# Patient Record
Sex: FEMALE | Race: OTHER | HISPANIC OR LATINO | URBAN - METROPOLITAN AREA
[De-identification: names, ages, dates, MRNs, and addresses within clinical notes are randomized per-mention and may not be internally consistent; named-entity substitution may affect disease eponyms.]

---

## 2022-01-26 ENCOUNTER — INPATIENT (INPATIENT)
Facility: HOSPITAL | Age: 82
LOS: 4 days | Discharge: NURSING, REHAB OR LONG-TERM CARE | DRG: 258 | End: 2022-01-31
Attending: INTERNAL MEDICINE | Admitting: INTERNAL MEDICINE
Payer: MEDICARE

## 2022-01-26 VITALS
WEIGHT: 139.99 LBS | TEMPERATURE: 98 F | DIASTOLIC BLOOD PRESSURE: 75 MMHG | HEART RATE: 57 BPM | HEIGHT: 63 IN | OXYGEN SATURATION: 97 % | RESPIRATION RATE: 18 BRPM | SYSTOLIC BLOOD PRESSURE: 137 MMHG

## 2022-01-26 LAB
ALBUMIN SERPL ELPH-MCNC: 2.5 G/DL — LOW (ref 3.4–5)
ALP SERPL-CCNC: 172 U/L — HIGH (ref 40–120)
ALT FLD-CCNC: 39 U/L — SIGNIFICANT CHANGE UP (ref 12–42)
ANION GAP SERPL CALC-SCNC: 6 MMOL/L — LOW (ref 9–16)
AST SERPL-CCNC: 63 U/L — HIGH (ref 15–37)
BASOPHILS # BLD AUTO: 0.04 K/UL — SIGNIFICANT CHANGE UP (ref 0–0.2)
BASOPHILS NFR BLD AUTO: 0.7 % — SIGNIFICANT CHANGE UP (ref 0–2)
BILIRUB SERPL-MCNC: 1.6 MG/DL — HIGH (ref 0.2–1.2)
BUN SERPL-MCNC: 13 MG/DL — SIGNIFICANT CHANGE UP (ref 7–23)
CALCIUM SERPL-MCNC: 8.4 MG/DL — LOW (ref 8.5–10.5)
CHLORIDE SERPL-SCNC: 108 MMOL/L — SIGNIFICANT CHANGE UP (ref 96–108)
CO2 SERPL-SCNC: 26 MMOL/L — SIGNIFICANT CHANGE UP (ref 22–31)
CREAT SERPL-MCNC: 0.8 MG/DL — SIGNIFICANT CHANGE UP (ref 0.5–1.3)
EOSINOPHIL # BLD AUTO: 0.14 K/UL — SIGNIFICANT CHANGE UP (ref 0–0.5)
EOSINOPHIL NFR BLD AUTO: 2.4 % — SIGNIFICANT CHANGE UP (ref 0–6)
GLUCOSE SERPL-MCNC: 108 MG/DL — HIGH (ref 70–99)
HCT VFR BLD CALC: 42.3 % — SIGNIFICANT CHANGE UP (ref 34.5–45)
HGB BLD-MCNC: 13.3 G/DL — SIGNIFICANT CHANGE UP (ref 11.5–15.5)
IMM GRANULOCYTES NFR BLD AUTO: 0.3 % — SIGNIFICANT CHANGE UP (ref 0–1.5)
LYMPHOCYTES # BLD AUTO: 0.71 K/UL — LOW (ref 1–3.3)
LYMPHOCYTES # BLD AUTO: 12 % — LOW (ref 13–44)
MAGNESIUM SERPL-MCNC: 1.8 MG/DL — SIGNIFICANT CHANGE UP (ref 1.6–2.6)
MCHC RBC-ENTMCNC: 31.2 PG — SIGNIFICANT CHANGE UP (ref 27–34)
MCHC RBC-ENTMCNC: 31.4 GM/DL — LOW (ref 32–36)
MCV RBC AUTO: 99.3 FL — SIGNIFICANT CHANGE UP (ref 80–100)
MONOCYTES # BLD AUTO: 0.78 K/UL — SIGNIFICANT CHANGE UP (ref 0–0.9)
MONOCYTES NFR BLD AUTO: 13.2 % — SIGNIFICANT CHANGE UP (ref 2–14)
NEUTROPHILS # BLD AUTO: 4.23 K/UL — SIGNIFICANT CHANGE UP (ref 1.8–7.4)
NEUTROPHILS NFR BLD AUTO: 71.4 % — SIGNIFICANT CHANGE UP (ref 43–77)
NRBC # BLD: 0 /100 WBCS — SIGNIFICANT CHANGE UP (ref 0–0)
NT-PROBNP SERPL-SCNC: 3525 PG/ML — HIGH
PLATELET # BLD AUTO: 147 K/UL — LOW (ref 150–400)
POTASSIUM SERPL-MCNC: 4.6 MMOL/L — SIGNIFICANT CHANGE UP (ref 3.5–5.3)
POTASSIUM SERPL-SCNC: 4.6 MMOL/L — SIGNIFICANT CHANGE UP (ref 3.5–5.3)
PROT SERPL-MCNC: 6.2 G/DL — LOW (ref 6.4–8.2)
RBC # BLD: 4.26 M/UL — SIGNIFICANT CHANGE UP (ref 3.8–5.2)
RBC # FLD: 14.9 % — HIGH (ref 10.3–14.5)
SARS-COV-2 RNA SPEC QL NAA+PROBE: SIGNIFICANT CHANGE UP
SODIUM SERPL-SCNC: 140 MMOL/L — SIGNIFICANT CHANGE UP (ref 132–145)
TROPONIN I, HIGH SENSITIVITY RESULT: 38.4 NG/L — SIGNIFICANT CHANGE UP
TSH SERPL-MCNC: 1.09 UIU/ML — SIGNIFICANT CHANGE UP (ref 0.36–3.74)
WBC # BLD: 5.92 K/UL — SIGNIFICANT CHANGE UP (ref 3.8–10.5)
WBC # FLD AUTO: 5.92 K/UL — SIGNIFICANT CHANGE UP (ref 3.8–10.5)

## 2022-01-26 PROCEDURE — 99285 EMERGENCY DEPT VISIT HI MDM: CPT

## 2022-01-26 PROCEDURE — 93970 EXTREMITY STUDY: CPT | Mod: 26

## 2022-01-26 PROCEDURE — 71045 X-RAY EXAM CHEST 1 VIEW: CPT | Mod: 26

## 2022-01-26 RX ORDER — FUROSEMIDE 40 MG
20 TABLET ORAL ONCE
Refills: 0 | Status: DISCONTINUED | OUTPATIENT
Start: 2022-01-26 | End: 2022-01-26

## 2022-01-26 RX ORDER — ACETAMINOPHEN 500 MG
975 TABLET ORAL ONCE
Refills: 0 | Status: COMPLETED | OUTPATIENT
Start: 2022-01-26 | End: 2022-01-26

## 2022-01-26 RX ADMIN — Medication 975 MILLIGRAM(S): at 22:15

## 2022-01-26 NOTE — ED PROVIDER NOTE - CLINICAL SUMMARY MEDICAL DECISION MAKING FREE TEXT BOX
bl LE edema, equal, +pitting, no evidence of ischemic limb on exam, no orthopnea/sob, lungs clear, no hx of chf, will check labs, US r/o DVT given reported hx of "blood clots", bl LE edema, equal, +pitting, no evidence of ischemic limb on exam, no orthopnea/sob, lungs clear, no hx of chf, will check labs, US r/o DVT given reported hx of "blood clots",    elevated pro-bnp, pt does not have any evidence of adhf, xray w/o pulm congestion, pro-bnp could be 2/2 PAH as pt reports hx of such, given pt requires assistance for ambulation/ADL, will need admission

## 2022-01-26 NOTE — ED ADULT NURSE NOTE - NSIMPLEMENTINTERV_GEN_ALL_ED
Implemented All Fall Risk Interventions:  [place] to call system. Call bell, personal items and telephone within reach. Instruct patient to call for assistance. Room bathroom lighting operational. Non-slip footwear when patient is off stretcher. Physically safe environment: no spills, clutter or unnecessary equipment. Stretcher in lowest position, wheels locked, appropriate side rails in place. Provide visual cue, wrist band, yellow gown, etc. Monitor gait and stability. Monitor for mental status changes and reorient to person, place, and time. Review medications for side effects contributing to fall risk. Reinforce activity limits and safety measures with patient and family.

## 2022-01-26 NOTE — ED ADULT NURSE NOTE - OBJECTIVE STATEMENT
Patient states has been having swelling of lower extremities that has progressed over the past two weeks. Denies difficulty breathing or chest pain. States has pain in legs and presents with +2 lower edema

## 2022-01-26 NOTE — ED PROVIDER NOTE - OBJECTIVE STATEMENT
81 yof pw [facility] swelling, x 1-2 weeks.  lives alone.  does not have aide.  able to walk in her apartment but does not leave the apartment on her own.  per son, he brought pt in bc he noted the swelling today.  pt c/o pain to bl LE.  no sob/cp/orthopnea.  hx of pulm hypertension, hx of ppm (for low heart rate), no prior cardiac stents, pt reports hx of clots in her lungs.  no fc/cough/abd pain/nv.    offered  service, pt prefers the son to interpret

## 2022-01-26 NOTE — ED ADULT TRIAGE NOTE - CHIEF COMPLAINT QUOTE
Pt BIBEMS for bilateral pedal lower extremity edema x 2 weeks. Pt denies chest pain and sob. Pt with pace maker currently on blood thinners.

## 2022-01-26 NOTE — ED PROVIDER NOTE - NS ED ROS FT
· CONSTITUTIONAL: no fever and no chills.  · EYES: no visual changes  · ENMT: no change/loss in taste  · CARDIOVASCULAR: swelling of LE, no cp  · RESPIRATORY: no cough/sob   · GASTROINTESTINAL: no pain, no nvd  · MUSCULOSKELETAL: no pain  · SKIN: no bleeding  · ENDOCRINE: no DM  · NEURO: no headache

## 2022-01-26 NOTE — ED PROVIDER NOTE - PHYSICAL EXAMINATION
Physical Exam  GEN: Awake, alert, non-toxic appearing,  EYES: full EOMI,  ENT: External inspection normal, normal voice,   HEAD: atraumatic  NECK: FROM neck, supple,   CV: rrr, 2+ bl pitting edema, +pitting edema overlying the ilium,  RESP: cta bl, no rales/crackles, no tachypnea, no hypoxia, no resp distress,  GI: soft, nondistended, nontender  MSK: brown x 4  SKIN: pedal pulse intact, cap refill < 2 sec, mildly brawny erythema over distal LE bl  NEURO: ao x 3, speaking clearly and coherently, strength equal bl,

## 2022-01-26 NOTE — ED PROVIDER NOTE - CARE PLAN
1 Principal Discharge DX:	Bilateral lower extremity edema  Secondary Diagnosis:	Generalized weakness  Secondary Diagnosis:	Pulmonary hypertension

## 2022-01-26 NOTE — ED PROVIDER NOTE - PROGRESS NOTE DETAILS
med list: eliquis, statin, metoprolo, fluoxetine med list: eliquis, statin, metoprolol, fluoxetine pt requires assistance to sit up in bed, and requires max assist to swing her legs off of the stretcher, upon trying to stand up, pt collapsed onto son's arms, no LOC/near syncope case reviewed w/ both medicine and cardiology team, will admit to cardiology tele for further workup

## 2022-01-27 DIAGNOSIS — R00.1 BRADYCARDIA, UNSPECIFIED: ICD-10-CM

## 2022-01-27 DIAGNOSIS — Z95.0 PRESENCE OF CARDIAC PACEMAKER: Chronic | ICD-10-CM

## 2022-01-27 DIAGNOSIS — Z98.49 CATARACT EXTRACTION STATUS, UNSPECIFIED EYE: Chronic | ICD-10-CM

## 2022-01-27 DIAGNOSIS — R60.0 LOCALIZED EDEMA: ICD-10-CM

## 2022-01-27 DIAGNOSIS — E78.00 PURE HYPERCHOLESTEROLEMIA, UNSPECIFIED: ICD-10-CM

## 2022-01-27 DIAGNOSIS — I10 ESSENTIAL (PRIMARY) HYPERTENSION: ICD-10-CM

## 2022-01-27 DIAGNOSIS — I27.20 PULMONARY HYPERTENSION, UNSPECIFIED: ICD-10-CM

## 2022-01-27 LAB
ANION GAP SERPL CALC-SCNC: 8 MMOL/L — SIGNIFICANT CHANGE UP (ref 5–17)
APTT BLD: 34.7 SEC — SIGNIFICANT CHANGE UP (ref 27.5–35.5)
BUN SERPL-MCNC: 11 MG/DL — SIGNIFICANT CHANGE UP (ref 7–23)
CALCIUM SERPL-MCNC: 8.5 MG/DL — SIGNIFICANT CHANGE UP (ref 8.4–10.5)
CHLORIDE SERPL-SCNC: 108 MMOL/L — SIGNIFICANT CHANGE UP (ref 96–108)
CO2 SERPL-SCNC: 23 MMOL/L — SIGNIFICANT CHANGE UP (ref 22–31)
CREAT SERPL-MCNC: 0.82 MG/DL — SIGNIFICANT CHANGE UP (ref 0.5–1.3)
GLUCOSE SERPL-MCNC: 113 MG/DL — HIGH (ref 70–99)
HCT VFR BLD CALC: 38.1 % — SIGNIFICANT CHANGE UP (ref 34.5–45)
HGB BLD-MCNC: 12 G/DL — SIGNIFICANT CHANGE UP (ref 11.5–15.5)
INR BLD: 1.3 — HIGH (ref 0.88–1.16)
MAGNESIUM SERPL-MCNC: 1.7 MG/DL — SIGNIFICANT CHANGE UP (ref 1.6–2.6)
MCHC RBC-ENTMCNC: 30.6 PG — SIGNIFICANT CHANGE UP (ref 27–34)
MCHC RBC-ENTMCNC: 31.5 GM/DL — LOW (ref 32–36)
MCV RBC AUTO: 97.2 FL — SIGNIFICANT CHANGE UP (ref 80–100)
NRBC # BLD: 0 /100 WBCS — SIGNIFICANT CHANGE UP (ref 0–0)
PLATELET # BLD AUTO: 152 K/UL — SIGNIFICANT CHANGE UP (ref 150–400)
POTASSIUM SERPL-MCNC: 3.7 MMOL/L — SIGNIFICANT CHANGE UP (ref 3.5–5.3)
POTASSIUM SERPL-SCNC: 3.7 MMOL/L — SIGNIFICANT CHANGE UP (ref 3.5–5.3)
PROTHROM AB SERPL-ACNC: 15.4 SEC — HIGH (ref 10.6–13.6)
RBC # BLD: 3.92 M/UL — SIGNIFICANT CHANGE UP (ref 3.8–5.2)
RBC # FLD: 14.8 % — HIGH (ref 10.3–14.5)
SODIUM SERPL-SCNC: 139 MMOL/L — SIGNIFICANT CHANGE UP (ref 135–145)
TROPONIN T SERPL-MCNC: 0.01 NG/ML — SIGNIFICANT CHANGE UP (ref 0–0.01)
WBC # BLD: 4.62 K/UL — SIGNIFICANT CHANGE UP (ref 3.8–10.5)
WBC # FLD AUTO: 4.62 K/UL — SIGNIFICANT CHANGE UP (ref 3.8–10.5)

## 2022-01-27 PROCEDURE — 99222 1ST HOSP IP/OBS MODERATE 55: CPT

## 2022-01-27 PROCEDURE — 33228 REMV&REPLC PM GEN DUAL LEAD: CPT

## 2022-01-27 PROCEDURE — 93010 ELECTROCARDIOGRAM REPORT: CPT

## 2022-01-27 PROCEDURE — 93306 TTE W/DOPPLER COMPLETE: CPT | Mod: 26

## 2022-01-27 RX ORDER — FUROSEMIDE 40 MG
40 TABLET ORAL DAILY
Refills: 0 | Status: DISCONTINUED | OUTPATIENT
Start: 2022-01-27 | End: 2022-01-27

## 2022-01-27 RX ORDER — CEFAZOLIN SODIUM 1 G
VIAL (EA) INJECTION
Refills: 0 | Status: COMPLETED | OUTPATIENT
Start: 2022-01-27 | End: 2022-01-28

## 2022-01-27 RX ORDER — METOPROLOL TARTRATE 50 MG
25 TABLET ORAL DAILY
Refills: 0 | Status: DISCONTINUED | OUTPATIENT
Start: 2022-01-27 | End: 2022-01-27

## 2022-01-27 RX ORDER — MAGNESIUM SULFATE 500 MG/ML
2 VIAL (ML) INJECTION ONCE
Refills: 0 | Status: COMPLETED | OUTPATIENT
Start: 2022-01-27 | End: 2022-01-27

## 2022-01-27 RX ORDER — APIXABAN 2.5 MG/1
2.5 TABLET, FILM COATED ORAL
Refills: 0 | Status: DISCONTINUED | OUTPATIENT
Start: 2022-01-27 | End: 2022-01-27

## 2022-01-27 RX ORDER — FUROSEMIDE 40 MG
40 TABLET ORAL DAILY
Refills: 0 | Status: DISCONTINUED | OUTPATIENT
Start: 2022-01-27 | End: 2022-01-28

## 2022-01-27 RX ORDER — FLUOXETINE HCL 10 MG
1 CAPSULE ORAL
Qty: 0 | Refills: 0 | DISCHARGE

## 2022-01-27 RX ORDER — CHOLECALCIFEROL (VITAMIN D3) 125 MCG
1 CAPSULE ORAL
Qty: 0 | Refills: 0 | DISCHARGE

## 2022-01-27 RX ORDER — POTASSIUM CHLORIDE 20 MEQ
20 PACKET (EA) ORAL ONCE
Refills: 0 | Status: COMPLETED | OUTPATIENT
Start: 2022-01-27 | End: 2022-01-27

## 2022-01-27 RX ORDER — FLUOXETINE HCL 10 MG
40 CAPSULE ORAL DAILY
Refills: 0 | Status: DISCONTINUED | OUTPATIENT
Start: 2022-01-27 | End: 2022-01-31

## 2022-01-27 RX ORDER — ATORVASTATIN CALCIUM 80 MG/1
1 TABLET, FILM COATED ORAL
Qty: 0 | Refills: 0 | DISCHARGE

## 2022-01-27 RX ORDER — ACETAMINOPHEN 500 MG
650 TABLET ORAL ONCE
Refills: 0 | Status: COMPLETED | OUTPATIENT
Start: 2022-01-27 | End: 2022-01-27

## 2022-01-27 RX ORDER — METOPROLOL TARTRATE 50 MG
1 TABLET ORAL
Qty: 0 | Refills: 0 | DISCHARGE

## 2022-01-27 RX ORDER — ATORVASTATIN CALCIUM 80 MG/1
10 TABLET, FILM COATED ORAL AT BEDTIME
Refills: 0 | Status: DISCONTINUED | OUTPATIENT
Start: 2022-01-27 | End: 2022-01-31

## 2022-01-27 RX ORDER — CEFAZOLIN SODIUM 1 G
1000 VIAL (EA) INJECTION ONCE
Refills: 0 | Status: COMPLETED | OUTPATIENT
Start: 2022-01-27 | End: 2022-01-27

## 2022-01-27 RX ORDER — ONDANSETRON 8 MG/1
4 TABLET, FILM COATED ORAL EVERY 8 HOURS
Refills: 0 | Status: DISCONTINUED | OUTPATIENT
Start: 2022-01-27 | End: 2022-01-31

## 2022-01-27 RX ORDER — APIXABAN 2.5 MG/1
1 TABLET, FILM COATED ORAL
Qty: 0 | Refills: 0 | DISCHARGE

## 2022-01-27 RX ORDER — LANOLIN ALCOHOL/MO/W.PET/CERES
3 CREAM (GRAM) TOPICAL AT BEDTIME
Refills: 0 | Status: DISCONTINUED | OUTPATIENT
Start: 2022-01-27 | End: 2022-01-31

## 2022-01-27 RX ORDER — ACETAMINOPHEN 500 MG
650 TABLET ORAL EVERY 6 HOURS
Refills: 0 | Status: DISCONTINUED | OUTPATIENT
Start: 2022-01-27 | End: 2022-01-31

## 2022-01-27 RX ORDER — CHOLECALCIFEROL (VITAMIN D3) 125 MCG
1000 CAPSULE ORAL DAILY
Refills: 0 | Status: DISCONTINUED | OUTPATIENT
Start: 2022-01-27 | End: 2022-01-31

## 2022-01-27 RX ORDER — CEFAZOLIN SODIUM 1 G
1000 VIAL (EA) INJECTION EVERY 8 HOURS
Refills: 0 | Status: COMPLETED | OUTPATIENT
Start: 2022-01-27 | End: 2022-01-28

## 2022-01-27 RX ORDER — INFLUENZA VIRUS VACCINE 15; 15; 15; 15 UG/.5ML; UG/.5ML; UG/.5ML; UG/.5ML
0.7 SUSPENSION INTRAMUSCULAR ONCE
Refills: 0 | Status: DISCONTINUED | OUTPATIENT
Start: 2022-01-27 | End: 2022-01-31

## 2022-01-27 RX ADMIN — Medication 40 MILLIGRAM(S): at 15:21

## 2022-01-27 RX ADMIN — Medication 100 MILLIGRAM(S): at 13:16

## 2022-01-27 RX ADMIN — Medication 20 MILLIEQUIVALENT(S): at 11:31

## 2022-01-27 RX ADMIN — Medication 650 MILLIGRAM(S): at 12:32

## 2022-01-27 RX ADMIN — Medication 40 MILLIGRAM(S): at 11:31

## 2022-01-27 RX ADMIN — Medication 650 MILLIGRAM(S): at 18:00

## 2022-01-27 RX ADMIN — APIXABAN 2.5 MILLIGRAM(S): 2.5 TABLET, FILM COATED ORAL at 05:57

## 2022-01-27 RX ADMIN — Medication 650 MILLIGRAM(S): at 11:32

## 2022-01-27 RX ADMIN — Medication 650 MILLIGRAM(S): at 05:15

## 2022-01-27 RX ADMIN — ATORVASTATIN CALCIUM 10 MILLIGRAM(S): 80 TABLET, FILM COATED ORAL at 22:07

## 2022-01-27 RX ADMIN — Medication 100 MILLIGRAM(S): at 22:07

## 2022-01-27 RX ADMIN — Medication 650 MILLIGRAM(S): at 04:41

## 2022-01-27 RX ADMIN — Medication 1000 UNIT(S): at 11:32

## 2022-01-27 RX ADMIN — Medication 650 MILLIGRAM(S): at 19:00

## 2022-01-27 RX ADMIN — Medication 25 GRAM(S): at 11:33

## 2022-01-27 RX ADMIN — Medication 25 MILLIGRAM(S): at 05:57

## 2022-01-27 NOTE — H&P ADULT - HISTORY OF PRESENT ILLNESS
This is a 82 y/o female with HTN, Pulmonary HTN, Hypercholesterolemia, bradycardia s/p PPM, presented to [facility] with 2 weeks of lower extremities swelling up to the thighs,  associating with pain, resulting in difficulty to ambulate. Pt denies SOB, orthopnea, chest pain  In the ER, initial vital stable as follow: /75, HR 57, R 18, T 36.7, O2 97% RA; labs with troponin I 38.4, AST 63, Alk phos 172, BNP 3525. US legs: No evidence of deep venous  thrombosis above the knee bilaterally. Nonvisualization of the bilateral peroneal and posterior tibial veins secondary to marked subcutaneous edema. CXR without pneumonia or CHF.  + cardiomegaly.  She received Lasix 20mg IV, Tylenol 975mg and transferred to [facility] for further management

## 2022-01-27 NOTE — H&P ADULT - ATTENDING COMMENTS
Initial attending contact date 1/27 on morning bedside rounds. See attending addendum to HPI here for initial attending contact documentation.  81F with Essential HTN, reported hx Pulmonary HTN, HLD, Bradycardia s/p PPM, reported hx PE formerly on Coumadin, hx pAfib now on Eliquis 2.5 BID, who presented to [facility] with report of difficulty walking due to leg pain and edema.  No CP, SOB, palpitations, LH or dizziness.  EP consulted upon arrival and PPM generator found to be at EOL, patient V-paced 50bpm.  This chronic V-pacing at 50bpm as safety back up in context of EOL status likely leading to low CO and subsequent clinical s/sx edema.    Plan for:  NPO for generator change with EP today  Lasix 40mg po daily gentle diuresis  ACE wrap LEs  Hold home Eliquis (dose reduced for age and weight <60kg)  Hold home beta blocker pending generator change  Obtain TTE for structural assessment  EP consult and expedited generator change appreciated  PT consulted, initial recommendation for BRITTANIE  Dietician/Nutrition consulted  Arelis Galeas M.D.  Cardiology Attending  80minutes spent on total encounter; more than 50% of the visit was spent counseling and/or coordinating care by the attending physician, with plan of care discussed with the patient, EP team at bedside and cardiac team.

## 2022-01-27 NOTE — H&P ADULT - NSHPREVIEWOFSYSTEMS_GEN_ALL_CORE
GENERAL, CONSTITUTIONAL : denies recent weight loss, fever, chills  EYES, VISION: denies changes in vision   EARS, NOSE, THROAT: denies hearing loss  HEART, CARDIOVASCULAR: + LE edema. Denies chest pain, arrhythmia, palpitations, SOB, claudication  RESPIRATORY: Denies cough, SOB, wheezing, PND, orthopnea  GASTROINTESTINAL: Denies abdominal pain, heartburn, bloody stool, dark tarry stool  GENITOURINARY: Denies frequent urination, urgency  MUSCULOSKELETAL denies joint pain or swelling, restricted motion, musculoskeletal pain.   SKIN & INTEGUMENTARY Denies rashes, sores, blisters, blisters, growths.  NEUROLOGICAL: Denies numbness or tingling sensations, sensation loss, burning.   PSYCHIATRIC: Denies nervousness, anxiety, depression  ENDOCRINE Denies heat or cold intolerance, excessive thirst  HEMATOLOGIC/LYMPHATIC: Denies abnormal bleeding, bleeding of any kind

## 2022-01-27 NOTE — PHYSICAL THERAPY INITIAL EVALUATION ADULT - ACTIVE RANGE OF MOTION EXAMINATION, REHAB EVAL
except decreased bilateral ankle DF/PF and patient with pain with right shoulder flexion/no Active ROM deficits were identified

## 2022-01-27 NOTE — PHYSICAL THERAPY INITIAL EVALUATION ADULT - PERTINENT HX OF CURRENT PROBLEM, REHAB EVAL
This is a 80 y/o female with HTN, Pulmonary HTN, Hypercholesterolemia, bradycardia s/p PPM, presented to [facility] with 2 weeks of lower extremities swelling up to the thighs,

## 2022-01-27 NOTE — H&P ADULT - NSHPPHYSICALEXAM_GEN_ALL_CORE
T(C): 36.7 (01-27-22 @ 05:10), Max: 37.1 (01-26-22 @ 20:51)  HR: 52 (01-27-22 @ 03:45) (52 - 57)  BP: 145/70 (01-27-22 @ 03:45) (137/75 - 145/70)  RR: 20 (01-27-22 @ 03:45) (18 - 20)  SpO2: 99% (01-27-22 @ 03:45) (97% - 99%)  Wt(kg): --    Appearance: Normal	  HEENT: Normal oral mucosa, PERRL, EOMI	  Neck: Supple, - JVD; No Carotid Bruit and 2+ pulses B/L  Cardiovascular: Normal S1 S2, No JVD, No murmurs  Respiratory: Lungs clear to auscultation, no Rales, Rhonchi, Wheezing	  Gastrointestinal:  Soft, Non-tender, + BS	  Skin: No rashes, No ecchymoses, No cyanosis  Extremities: 3+ pitting edema up to the thighs b/l  Vascular: Femoral pulses 2+ b/l without bruit, DP 1+ b/l, PT 1+ b/l  Neurologic: Non-focal  Psychiatry: A & O x 3, Mood & affect appropriate

## 2022-01-27 NOTE — PROGRESS NOTE ADULT - SUBJECTIVE AND OBJECTIVE BOX
EPS Device interrogation    Indication: SOB    Device model: 	[employer] [place] 			    Functioning Mode: 	VVI %) 		    Underlying Rhythm:     Pacemaker dependency:  No    Battery status: EOL 11/23/21   Interrogating parameters:   				RA			RV			LV    Sense:                                                                    Threshold:                                                                                                                       Pacing Impedance:                                                                                                               Shock Impedance:                                                                                                                   Events/Alert:  none    Parameter change: 	    PAtient with [employer] PPM , battery is at EOL since 11/23/21,  pacemaker is in VVI 50 mode unable to do full interrogation at this time, patient needs generator change. Will plan fo procedure today.    [ ]EPS attending: Interrogation reviewed. Agree with above.

## 2022-01-27 NOTE — PATIENT PROFILE ADULT - FALL HARM RISK - HARM RISK INTERVENTIONS

## 2022-01-27 NOTE — H&P ADULT - PROBLEM SELECTOR PLAN 1
Up to the thighs, no sign of cellulitis  US LE without DVT +subcutaneous edema  Received Lasix 20mg x 1

## 2022-01-27 NOTE — H&P ADULT - NSICDXPASTMEDICALHX_GEN_ALL_CORE_FT
PAST MEDICAL HISTORY:  Anxiety and depression     Bradycardia     History of pacemaker     HTN (hypertension)     Hypercholesterolemia     Pulmonary hypertension

## 2022-01-27 NOTE — GOALS OF CARE CONVERSATION - ADVANCED CARE PLANNING - CONVERSATION DETAILS
Pt's PPM has been at [facility] since September and have previous rescheduled PPM generator change until 2/9/22 at [facility]. Now PPM found to be EOL upon PPM interrogation today. Discussed GOC with patient and pt wishes to have PPM generator change at [facility] and wishes to be FULL CODE.

## 2022-01-27 NOTE — H&P ADULT - NSHPLABSRESULTS_GEN_ALL_CORE
13.3   5.92  )-----------( 147      ( 26 Jan 2022 21:06 )             42.3       01-26    140  |  108  |  13  ----------------------------<  108<H>  4.6   |  26  |  0.80    Ca    8.4<L>      26 Jan 2022 21:06  Mg     1.8     01-26    TPro  6.2<L>  /  Alb  2.5<L>  /  TBili  1.6<H>  /  DBili  x   /  AST  63<H>  /  ALT  39  /  AlkPhos  172<H>  01-26        EKG: pending

## 2022-01-28 DIAGNOSIS — I50.810 RIGHT HEART FAILURE, UNSPECIFIED: ICD-10-CM

## 2022-01-28 DIAGNOSIS — Z45.010 ENCOUNTER FOR CHECKING AND TESTING OF CARDIAC PACEMAKER PULSE GENERATOR [BATTERY]: ICD-10-CM

## 2022-01-28 DIAGNOSIS — I48.0 PAROXYSMAL ATRIAL FIBRILLATION: ICD-10-CM

## 2022-01-28 DIAGNOSIS — I50.31 ACUTE DIASTOLIC (CONGESTIVE) HEART FAILURE: ICD-10-CM

## 2022-01-28 LAB
A1C WITH ESTIMATED AVERAGE GLUCOSE RESULT: 7.3 % — HIGH (ref 4–5.6)
ALBUMIN SERPL ELPH-MCNC: 3.1 G/DL — LOW (ref 3.3–5)
ALP SERPL-CCNC: 173 U/L — HIGH (ref 40–120)
ALT FLD-CCNC: 24 U/L — SIGNIFICANT CHANGE UP (ref 10–45)
ANION GAP SERPL CALC-SCNC: 10 MMOL/L — SIGNIFICANT CHANGE UP (ref 5–17)
AST SERPL-CCNC: 38 U/L — SIGNIFICANT CHANGE UP (ref 10–40)
BILIRUB SERPL-MCNC: 1.2 MG/DL — SIGNIFICANT CHANGE UP (ref 0.2–1.2)
BUN SERPL-MCNC: 14 MG/DL — SIGNIFICANT CHANGE UP (ref 7–23)
CALCIUM SERPL-MCNC: 9.1 MG/DL — SIGNIFICANT CHANGE UP (ref 8.4–10.5)
CHLORIDE SERPL-SCNC: 106 MMOL/L — SIGNIFICANT CHANGE UP (ref 96–108)
CHOLEST SERPL-MCNC: 100 MG/DL — SIGNIFICANT CHANGE UP
CO2 SERPL-SCNC: 26 MMOL/L — SIGNIFICANT CHANGE UP (ref 22–31)
CREAT SERPL-MCNC: 1.02 MG/DL — SIGNIFICANT CHANGE UP (ref 0.5–1.3)
ESTIMATED AVERAGE GLUCOSE: 163 MG/DL — HIGH (ref 68–114)
GLUCOSE BLDC GLUCOMTR-MCNC: 131 MG/DL — HIGH (ref 70–99)
GLUCOSE SERPL-MCNC: 93 MG/DL — SIGNIFICANT CHANGE UP (ref 70–99)
HCT VFR BLD CALC: 44.1 % — SIGNIFICANT CHANGE UP (ref 34.5–45)
HDLC SERPL-MCNC: 41 MG/DL — LOW
HGB BLD-MCNC: 14.2 G/DL — SIGNIFICANT CHANGE UP (ref 11.5–15.5)
LIPID PNL WITH DIRECT LDL SERPL: 46 MG/DL — SIGNIFICANT CHANGE UP
MAGNESIUM SERPL-MCNC: 1.8 MG/DL — SIGNIFICANT CHANGE UP (ref 1.6–2.6)
MCHC RBC-ENTMCNC: 31.1 PG — SIGNIFICANT CHANGE UP (ref 27–34)
MCHC RBC-ENTMCNC: 32.2 GM/DL — SIGNIFICANT CHANGE UP (ref 32–36)
MCV RBC AUTO: 96.7 FL — SIGNIFICANT CHANGE UP (ref 80–100)
NON HDL CHOLESTEROL: 59 MG/DL — SIGNIFICANT CHANGE UP
NRBC # BLD: 0 /100 WBCS — SIGNIFICANT CHANGE UP (ref 0–0)
PLATELET # BLD AUTO: 197 K/UL — SIGNIFICANT CHANGE UP (ref 150–400)
POTASSIUM SERPL-MCNC: 3.6 MMOL/L — SIGNIFICANT CHANGE UP (ref 3.5–5.3)
POTASSIUM SERPL-SCNC: 3.6 MMOL/L — SIGNIFICANT CHANGE UP (ref 3.5–5.3)
PROT SERPL-MCNC: 6.3 G/DL — SIGNIFICANT CHANGE UP (ref 6–8.3)
RBC # BLD: 4.56 M/UL — SIGNIFICANT CHANGE UP (ref 3.8–5.2)
RBC # FLD: 14.7 % — HIGH (ref 10.3–14.5)
SODIUM SERPL-SCNC: 142 MMOL/L — SIGNIFICANT CHANGE UP (ref 135–145)
TRIGL SERPL-MCNC: 64 MG/DL — SIGNIFICANT CHANGE UP
TSH SERPL-MCNC: 1.09 UIU/ML — SIGNIFICANT CHANGE UP (ref 0.27–4.2)
WBC # BLD: 5.88 K/UL — SIGNIFICANT CHANGE UP (ref 3.8–10.5)
WBC # FLD AUTO: 5.88 K/UL — SIGNIFICANT CHANGE UP (ref 3.8–10.5)

## 2022-01-28 PROCEDURE — 99233 SBSQ HOSP IP/OBS HIGH 50: CPT

## 2022-01-28 PROCEDURE — 99231 SBSQ HOSP IP/OBS SF/LOW 25: CPT

## 2022-01-28 RX ORDER — METOPROLOL TARTRATE 50 MG
25 TABLET ORAL DAILY
Refills: 0 | Status: DISCONTINUED | OUTPATIENT
Start: 2022-01-28 | End: 2022-01-31

## 2022-01-28 RX ORDER — MAGNESIUM SULFATE 500 MG/ML
2 VIAL (ML) INJECTION ONCE
Refills: 0 | Status: COMPLETED | OUTPATIENT
Start: 2022-01-28 | End: 2022-01-28

## 2022-01-28 RX ORDER — APIXABAN 2.5 MG/1
2.5 TABLET, FILM COATED ORAL EVERY 12 HOURS
Refills: 0 | Status: DISCONTINUED | OUTPATIENT
Start: 2022-01-28 | End: 2022-01-29

## 2022-01-28 RX ORDER — POTASSIUM CHLORIDE 20 MEQ
40 PACKET (EA) ORAL ONCE
Refills: 0 | Status: COMPLETED | OUTPATIENT
Start: 2022-01-28 | End: 2022-01-28

## 2022-01-28 RX ADMIN — Medication 40 MILLIGRAM(S): at 05:37

## 2022-01-28 RX ADMIN — APIXABAN 2.5 MILLIGRAM(S): 2.5 TABLET, FILM COATED ORAL at 17:28

## 2022-01-28 RX ADMIN — Medication 40 MILLIGRAM(S): at 11:56

## 2022-01-28 RX ADMIN — Medication 100 MILLIGRAM(S): at 05:37

## 2022-01-28 RX ADMIN — Medication 650 MILLIGRAM(S): at 11:59

## 2022-01-28 RX ADMIN — Medication 1000 UNIT(S): at 11:57

## 2022-01-28 RX ADMIN — Medication 25 MILLIGRAM(S): at 14:35

## 2022-01-28 RX ADMIN — Medication 40 MILLIEQUIVALENT(S): at 11:57

## 2022-01-28 RX ADMIN — Medication 25 GRAM(S): at 11:57

## 2022-01-28 NOTE — PROVIDER CONTACT NOTE (FALL NOTIFICATION) - ASSESSMENT
VSS, IV inadvertently removed , blood trickling down hand and on gown , pt speaking Pashto not responding in English initially

## 2022-01-28 NOTE — PROGRESS NOTE ADULT - PROBLEM SELECTOR PLAN 5
Pt with PPM, states that she needs generator change  Will ask EP to interrogate -Continue Lipitor 10mg daily  -F/u Lipid profile    F: No IVF  N: DASH/TLC/DM diet  E: Replete lytes PRN K<4, Mg<2  P: DVT PPX: on Eliquis  C: FULL CODE  Dispo: Medically stable for DC, needs safe dispo. No AM labs needs pending dispo.  PT/OT rec BRITTANIE, but pt and family is hesitant. Daughter Heydi Osborn wants pt to be D/C to her house in [place]

## 2022-01-28 NOTE — OCCUPATIONAL THERAPY INITIAL EVALUATION ADULT - GENERAL OBSERVATIONS, REHAB EVAL
Pt received semi-supine in bed, +tele, +heplock, +BLE ACE wrap, in NAD and agreeable to OT. Cleared by FAIZAN Amanda to see.

## 2022-01-28 NOTE — PROGRESS NOTE ADULT - PROBLEM SELECTOR PLAN 2
On Eliquis, will continue [employer] PPM was at EOL upon EP interrogation w/ VVI Pacing 50s  -S/p [employer] PPM generator change 1/27 w/ Dr Chairez, now A-Pacing 70-80s  -Pt may follow up with [facility] EP or [place] EP per her preference

## 2022-01-28 NOTE — PROGRESS NOTE ADULT - PROBLEM SELECTOR PLAN 1
Up to the thighs, no sign of cellulitis  US LE without DVT +subcutaneous edema  Received Lasix 20mg x 1 C/o diogenes LE swelling x 2 weeks a/w CHATTERJEE. CXR w/ cardiomegaly. BNP 3525. EKG nonischemic.  -Etiology likely iatrogenic cause from PPM EOL status and VVI pacing at 50bpm w/ AV dissociation and decrease CO  -S/p IV Lasix 40mg x 1 day  -No further need for diuresis, now euvolemic  -ECHO 1/27: EF 55-60%, RV overload, severely dilated RV, biatrial enlargement, mild-moderate MR, severe TR, PASP 19.  -Diogenes LE US 1/26: negative for DVT above the knee bilaterally. Nonvisualization of the bilateral peroneal and posterior tibial veins secondary to marked subcutaneous edema.

## 2022-01-28 NOTE — OCCUPATIONAL THERAPY INITIAL EVALUATION ADULT - MODIFIED CLINICAL TEST OF SENSORY INTEGRATION IN BALANCE TEST
Pt able to ambulate ~50'x2 with RW and min-CGA, pt demo narrow LV, decreased step length, and flexed posture, no buckling or LOB noted.

## 2022-01-28 NOTE — PROGRESS NOTE ADULT - PROBLEM SELECTOR PLAN 3
Continue Toprol 25mg daily Hx of PAF and PAflutter.   -c/w home Eliquis 2.5mg BID  -Resume home Toprol 25mg qd now s/p PPM gen change

## 2022-01-28 NOTE — OCCUPATIONAL THERAPY INITIAL EVALUATION ADULT - DIAGNOSIS, OT EVAL
Pt presents with impaired balance, generalized weakness, decreased activity tolerance, and BLE pain impacting overall ease and ability to perform ADLs and functional transfers.

## 2022-01-28 NOTE — PROGRESS NOTE ADULT - ASSESSMENT
81F with HTN, HLD, mild pulm HTN, Afib/Aflutter (on Eliquis), SSS s/p PPM, transferred from [facility] ED to [facility] cardiac tele with luís LE edema x 2 weeks. PPM found to be at EOL likely contributing to R sided HF. Now s/p PPM generator change 1/27 and IV diuresis x 1day. PT rec BRITTANIE. 81F with HTN, HLD, mild pulm HTN, Afib/Aflutter (on Eliquis), SSS s/p PPM, transferred from [facility] ED to [facility] cardiac tele with luís LE edema x 2 weeks. PPM found to be at EOL likely contributing to R sided HF. Now s/p PPM generator change 1/27 and IV diuresis x 1day. PT rec BRITTANIE, but pt and family hesitant.

## 2022-01-28 NOTE — OCCUPATIONAL THERAPY INITIAL EVALUATION ADULT - PLANNED THERAPY INTERVENTIONS, OT EVAL
ADL retraining/IADL retraining/balance training/bed mobility training/neuromuscular re-education/parent/caregiver training.../ROM/strengthening/transfer training

## 2022-01-28 NOTE — PROGRESS NOTE ADULT - SUBJECTIVE AND OBJECTIVE BOX
EPS Progress Note    S: in bed, feels better, SOB improved      MEDICATIONS  (STANDING):  atorvastatin 10 milliGRAM(s) Oral at bedtime  cholecalciferol 1000 Unit(s) Oral daily  FLUoxetine 40 milliGRAM(s) Oral daily  furosemide   Injectable 40 milliGRAM(s) IV Push daily  influenza  Vaccine (HIGH DOSE) 0.7 milliLiter(s) IntraMuscular once  magnesium sulfate  IVPB 2 Gram(s) IV Intermittent once  potassium chloride    Tablet ER 40 milliEquivalent(s) Oral once      Telemetry:  A.paced @ 70 BPM            General:  NAD        HEENT:  PERRL, EOMI	  Neck: Supple, - JVD  Cardiovascular: S1 S2, No JVD  Respiratory: CTA B/L      Gastrointestinal:  Soft, Non-tender, + BS	  Skin: No rashes, No ecchymoses, No cyanosis  Extremities: + edema  Psychiatry: A & O x 3         Labs:                                                               14.2   5.88  )-----------( 197      ( 28 Jan 2022 07:33 )             44.1     01-28    142  |  106  |  14  ----------------------------<  93  3.6   |  26  |  1.02    Ca    9.1      28 Jan 2022 07:33  Mg     1.8     01-28    TPro  6.3  /  Alb  3.1<L>  /  TBili  1.2  /  DBili  x   /  AST  38  /  ALT  24  /  AlkPhos  173<H>  01-28    PT/INR - ( 27 Jan 2022 06:45 )   PT: 15.4 sec;   INR: 1.30          PTT - ( 27 Jan 2022 06:45 )  PTT:34.7 sec    Assessment/Plan:    80 y/o female with HTN, Pulmonary HTN, Hypercholesterolemia, bradycardia s/p PPM, presented to [facility] with 2 weeks of lower extremities swelling up to the thighs . PPM interrogation show PPM at EOL , patient has PPM generator change yesterday.   Dressing removed, site clean no bleeding, no swelling, no hematoma. Ok to resume Eliquis. Patient can follow up with EPS at [facility] for her PPM needs.

## 2022-01-28 NOTE — PROVIDER CONTACT NOTE (FALL NOTIFICATION) - SITUATION
pt bed alarm went off. Room dark - pt  found to be standing up by the window out the bed looking disheveled and confused ,while assisting pt back to bed  pt legs gave out and landed on butt

## 2022-01-28 NOTE — OCCUPATIONAL THERAPY INITIAL EVALUATION ADULT - PERTINENT HX OF CURRENT PROBLEM, REHAB EVAL
This is a 82 y/o female with HTN, Pulmonary HTN, Hypercholesterolemia, bradycardia s/p PPM, presented to [facility] with 2 weeks of lower extremities swelling up to the thighs,

## 2022-01-28 NOTE — OCCUPATIONAL THERAPY INITIAL EVALUATION ADULT - MODALITIES TREATMENT COMMENTS
As per nursing, pt with fall last night 2/2 knee buckling when returning back to bed. During OT IE, no knee buckling noted during OOB activity although pt with complaints of increasing BLE pain stating "feeling tight."

## 2022-01-28 NOTE — OCCUPATIONAL THERAPY INITIAL EVALUATION ADULT - ASSISTIVE DEVICE FOR TRANSFER: STAND/SIT, REHAB EVAL
Labor Epidural    Patient location during procedure: OB  Performed By  CRNA: AIME SALES  Preanesthetic Checklist  Completed: patient identified, site marked, surgical consent, pre-op evaluation, timeout performed, IV checked, risks and benefits discussed and monitors and equipment checked  Additional Notes  1st attempt, persistant paresthesia on R side.   2nd attempt no paresthesia.   Prep:  Pt Position:sitting  Sterile Tech:gloves and sterile barrier  Prep:chlorhexidine gluconate and isopropyl alcohol  Monitoring:blood pressure monitoring and continuous pulse oximetry  Epidural Block Procedure:  Approach:midline  Guidance:landmark technique and palpation technique  Location:L3-L4  Needle Type:Tuohy  Needle Gauge:18 G  Loss of Resistance Medium: saline  Loss of Resistance: 6cm  Cath Depth at skin:12 (Catheter passed without resistance or paresthesia) cm  Paresthesia: none  Aspiration:negative  Test Dose:negative (3ml)  Number of Attempts: 2  Post Assessment:  Dressing:occlusive dressing applied and secured with tape  Pt Tolerance:patient tolerated the procedure well with no apparent complications  Complications:no             rolling walker

## 2022-01-28 NOTE — OCCUPATIONAL THERAPY INITIAL EVALUATION ADULT - ADDITIONAL COMMENTS
Pt lives alone in an apartment with elevator access. Prior to admit, pt states she was independent in ambulation, ADLs and IADLs. Did not require any AD to ambulate. Pt has a tub/shower combination with a shower chair and grab bars in bathroom.

## 2022-01-28 NOTE — PROGRESS NOTE ADULT - SUBJECTIVE AND OBJECTIVE BOX
CARDIOLOGY NP PROGRESS NOTE    Subjective: Pt seen and examined at bedside. Report feeling much better and improvement of diogenes LE edema. Denies chest pain, sob, lightheadedness, dizziness, palpitations, fever, chills.  Remainder ROS otherwise negative.    Overnight Events: s/p PPM generator change yesterday.    TELEMETRY: A-paced 70        VITAL SIGNS:  T(C): 36.4 (01-28-22 @ 13:55), Max: 36.4 (01-28-22 @ 05:28)  HR: 70 (01-28-22 @ 12:20) (70 - 79)  BP: 138/70 (01-28-22 @ 12:20) (137/67 - 161/90)  RR: 18 (01-28-22 @ 12:20) (17 - 20)  SpO2: 98% (01-28-22 @ 12:20) (95% - 99%)  Wt(kg): --    I&O's Summary    27 Jan 2022 07:01  -  28 Jan 2022 07:00  --------------------------------------------------------  IN: 180 mL / OUT: 3000 mL / NET: -2820 mL    28 Jan 2022 07:01  -  28 Jan 2022 15:37  --------------------------------------------------------  IN: 180 mL / OUT: 3500 mL / NET: -3320 mL          PHYSICAL EXAM:    General: A/ox 3, No acute Distress  Neck: Supple, NO JVD  Cardiac: S1 S2, No M/R/G. L chest wall PPM site CDI  Pulmonary: CTAB, Breathing unlabored, No Rhonchi/Rales/Wheezing  Abdomen: Soft, Non -tender, +BS x 4 quads  Extremities: No Rashes, Diogenes trace LE edema w/ skin wrinkling  Neuro: A/o x 3, No focal deficits          LABS:                          14.2   5.88  )-----------( 197      ( 28 Jan 2022 07:33 )             44.1                              01-28    142  |  106  |  14  ----------------------------<  93  3.6   |  26  |  1.02    Ca    9.1      28 Jan 2022 07:33  Mg     1.8     01-28    TPro  6.3  /  Alb  3.1<L>  /  TBili  1.2  /  DBili  x   /  AST  38  /  ALT  24  /  AlkPhos  173<H>  01-28    LIVER FUNCTIONS - ( 28 Jan 2022 07:33 )  Alb: 3.1 g/dL / Pro: 6.3 g/dL / ALK PHOS: 173 U/L / ALT: 24 U/L / AST: 38 U/L / GGT: x         PT/INR - ( 27 Jan 2022 06:45 )   PT: 15.4 sec;   INR: 1.30          PTT - ( 27 Jan 2022 06:45 )  PTT:34.7 sec  CAPILLARY BLOOD GLUCOSE      POCT Blood Glucose.: 131 mg/dL (28 Jan 2022 13:35)    CARDIAC MARKERS ( 27 Jan 2022 06:44 )  x     / 0.01 ng/mL / x     / x     / x              Allergies:  No Known Allergies    MEDICATIONS  (STANDING):  apixaban 2.5 milliGRAM(s) Oral every 12 hours  atorvastatin 10 milliGRAM(s) Oral at bedtime  cholecalciferol 1000 Unit(s) Oral daily  FLUoxetine 40 milliGRAM(s) Oral daily  influenza  Vaccine (HIGH DOSE) 0.7 milliLiter(s) IntraMuscular once  metoprolol succinate ER 25 milliGRAM(s) Oral daily    MEDICATIONS  (PRN):  acetaminophen     Tablet .. 650 milliGRAM(s) Oral every 6 hours PRN Temp greater or equal to 38C (100.4F), Mild Pain (1 - 3)  aluminum hydroxide/magnesium hydroxide/simethicone Suspension 30 milliLiter(s) Oral every 4 hours PRN Dyspepsia  melatonin 3 milliGRAM(s) Oral at bedtime PRN Insomnia  ondansetron Injectable 4 milliGRAM(s) IV Push every 8 hours PRN Nausea and/or Vomiting        DIAGNOSTIC TESTS:

## 2022-01-28 NOTE — PROVIDER CONTACT NOTE (FALL NOTIFICATION) - ACTION/TREATMENT ORDERED:
pt placed in bed w/ RN and PCA assist . EKG completed . bed alarm reactivated . Pt placed on Echo monitor to more closely watch patient. PCA at monitors watching patient

## 2022-01-29 ENCOUNTER — TRANSCRIPTION ENCOUNTER (OUTPATIENT)
Age: 82
End: 2022-01-29

## 2022-01-29 DIAGNOSIS — T14.8XXA OTHER INJURY OF UNSPECIFIED BODY REGION, INITIAL ENCOUNTER: ICD-10-CM

## 2022-01-29 DIAGNOSIS — Z95.0 PRESENCE OF CARDIAC PACEMAKER: ICD-10-CM

## 2022-01-29 PROCEDURE — 99233 SBSQ HOSP IP/OBS HIGH 50: CPT

## 2022-01-29 RX ADMIN — Medication 1000 UNIT(S): at 12:46

## 2022-01-29 RX ADMIN — Medication 30 MILLILITER(S): at 18:20

## 2022-01-29 RX ADMIN — Medication 25 MILLIGRAM(S): at 10:57

## 2022-01-29 RX ADMIN — Medication 3 MILLIGRAM(S): at 22:15

## 2022-01-29 RX ADMIN — Medication 40 MILLIGRAM(S): at 12:46

## 2022-01-29 NOTE — PROGRESS NOTE ADULT - SUBJECTIVE AND OBJECTIVE BOX
CARDIOLOGY NP PROGRESS NOTE    Subjective:   Remainder ROS otherwise negative.    Overnight Events:     TELEMETRY:    EKG:      VITAL SIGNS:  T(C): 36.6 (01-29-22 @ 09:29), Max: 36.6 (01-29-22 @ 09:29)  HR: 71 (01-29-22 @ 10:47) (64 - 73)  BP: 118/61 (01-29-22 @ 10:47) (118/61 - 150/73)  RR: 16 (01-29-22 @ 10:47) (16 - 18)  SpO2: 96% (01-29-22 @ 10:47) (95% - 97%)  Wt(kg): --    I&O's Summary    28 Jan 2022 07:01  -  29 Jan 2022 07:00  --------------------------------------------------------  IN: 180 mL / OUT: 3500 mL / NET: -3320 mL    29 Jan 2022 07:01  -  29 Jan 2022 13:26  --------------------------------------------------------  IN: 120 mL / OUT: 0 mL / NET: 120 mL          PHYSICAL EXAM:    General: A/ox 3, No acute Distress  Neck: Supple, NO JVD  Cardiac: S1 S2, No M/R/G  Pulmonary: CTAB, Breathing unlabored, No Rhonchi/Rales/Wheezing  Abdomen: Soft, Non -tender, +BS x 4 quads  Extremities: No Rashes, No edema  Neuro: A/o x 3, No focal deficits          LABS:                          14.2   5.88  )-----------( 197      ( 28 Jan 2022 07:33 )             44.1                              01-28    142  |  106  |  14  ----------------------------<  93  3.6   |  26  |  1.02    Ca    9.1      28 Jan 2022 07:33  Mg     1.8     01-28    TPro  6.3  /  Alb  3.1<L>  /  TBili  1.2  /  DBili  x   /  AST  38  /  ALT  24  /  AlkPhos  173<H>  01-28    LIVER FUNCTIONS - ( 28 Jan 2022 07:33 )  Alb: 3.1 g/dL / Pro: 6.3 g/dL / ALK PHOS: 173 U/L / ALT: 24 U/L / AST: 38 U/L / GGT: x                                   CAPILLARY BLOOD GLUCOSE      POCT Blood Glucose.: 131 mg/dL (28 Jan 2022 13:35)            Allergies:  No Known Allergies    MEDICATIONS  (STANDING):  atorvastatin 10 milliGRAM(s) Oral at bedtime  cholecalciferol 1000 Unit(s) Oral daily  FLUoxetine 40 milliGRAM(s) Oral daily  influenza  Vaccine (HIGH DOSE) 0.7 milliLiter(s) IntraMuscular once  metoprolol succinate ER 25 milliGRAM(s) Oral daily    MEDICATIONS  (PRN):  acetaminophen     Tablet .. 650 milliGRAM(s) Oral every 6 hours PRN Temp greater or equal to 38C (100.4F), Mild Pain (1 - 3)  aluminum hydroxide/magnesium hydroxide/simethicone Suspension 30 milliLiter(s) Oral every 4 hours PRN Dyspepsia  melatonin 3 milliGRAM(s) Oral at bedtime PRN Insomnia  ondansetron Injectable 4 milliGRAM(s) IV Push every 8 hours PRN Nausea and/or Vomiting        DIAGNOSTIC TESTS:        CARDIOLOGY NP PROGRESS NOTE    Subjective: Pt seen and examined at bedside. Reports feeling mild soreness to PPM site w/ swelling last night. Denies chest pain, sob, lightheadedness, dizziness, palpitations, fever, chills.  Remainder ROS otherwise negative.    Overnight Events: Developed small pocket hematoma to PPM site. EP called for reeval this AM and Eliquis held.     TELEMETRY: SR 70s, intermittently A-paced 70s         VITAL SIGNS:  T(C): 36.6 (01-29-22 @ 09:29), Max: 36.6 (01-29-22 @ 09:29)  HR: 71 (01-29-22 @ 10:47) (64 - 73)  BP: 118/61 (01-29-22 @ 10:47) (118/61 - 150/73)  RR: 16 (01-29-22 @ 10:47) (16 - 18)  SpO2: 96% (01-29-22 @ 10:47) (95% - 97%)  Wt(kg): --    I&O's Summary    28 Jan 2022 07:01  -  29 Jan 2022 07:00  --------------------------------------------------------  IN: 180 mL / OUT: 3500 mL / NET: -3320 mL    29 Jan 2022 07:01  -  29 Jan 2022 13:26  --------------------------------------------------------  IN: 120 mL / OUT: 0 mL / NET: 120 mL          PHYSICAL EXAM:    General: A/ox 3, No acute Distress  Neck: Supple, NO JVD  Cardiac: S1 S2, No M/R/G. L chest wall PPM site w/ small pocket hematoma  Pulmonary: CTAB, Breathing unlabored on RA, No Rhonchi/Rales/Wheezing  Abdomen: Soft, Non -tender, +BS x 4 quads  Extremities: No Rashes, Diogenes trace LE edema w/ skin wrinkling  Neuro: A/o x 3, No focal deficits          LABS:                          14.2   5.88  )-----------( 197      ( 28 Jan 2022 07:33 )             44.1                              01-28    142  |  106  |  14  ----------------------------<  93  3.6   |  26  |  1.02    Ca    9.1      28 Jan 2022 07:33  Mg     1.8     01-28    TPro  6.3  /  Alb  3.1<L>  /  TBili  1.2  /  DBili  x   /  AST  38  /  ALT  24  /  AlkPhos  173<H>  01-28    LIVER FUNCTIONS - ( 28 Jan 2022 07:33 )  Alb: 3.1 g/dL / Pro: 6.3 g/dL / ALK PHOS: 173 U/L / ALT: 24 U/L / AST: 38 U/L / GGT: x                                   CAPILLARY BLOOD GLUCOSE      POCT Blood Glucose.: 131 mg/dL (28 Jan 2022 13:35)          Allergies:  No Known Allergies    MEDICATIONS  (STANDING):  atorvastatin 10 milliGRAM(s) Oral at bedtime  cholecalciferol 1000 Unit(s) Oral daily  FLUoxetine 40 milliGRAM(s) Oral daily  influenza  Vaccine (HIGH DOSE) 0.7 milliLiter(s) IntraMuscular once  metoprolol succinate ER 25 milliGRAM(s) Oral daily    MEDICATIONS  (PRN):  acetaminophen     Tablet .. 650 milliGRAM(s) Oral every 6 hours PRN Temp greater or equal to 38C (100.4F), Mild Pain (1 - 3)  aluminum hydroxide/magnesium hydroxide/simethicone Suspension 30 milliLiter(s) Oral every 4 hours PRN Dyspepsia  melatonin 3 milliGRAM(s) Oral at bedtime PRN Insomnia  ondansetron Injectable 4 milliGRAM(s) IV Push every 8 hours PRN Nausea and/or Vomiting        DIAGNOSTIC TESTS:

## 2022-01-29 NOTE — PROGRESS NOTE ADULT - PROBLEM SELECTOR PLAN 3
Hx of PAF and PAflutter.   -c/w home Eliquis 2.5mg BID  -Resume home Toprol 25mg qd now s/p PPM gen change

## 2022-01-29 NOTE — CONSULT NOTE ADULT - ASSESSMENT
81 yr old woman with HTN, pulmonary HTN, pAFib on a/c, HLD, and bradycardia s/p PPM admitted for LE edema found to have PPM at EOL s/p generator change restarted on Eliquis yesterday after having an uncomplicated post-op course now with findings consistent with mild to moderate pocket hematoma.

## 2022-01-29 NOTE — DISCHARGE NOTE PROVIDER - NSDCCPCAREPLAN_GEN_ALL_CORE_FT
PRINCIPAL DISCHARGE DIAGNOSIS  Diagnosis: Acute heart failure with preserved ejection fraction (HFpEF)  Assessment and Plan of Treatment:       SECONDARY DISCHARGE DIAGNOSES  Diagnosis: Pacemaker generator end of life  Assessment and Plan of Treatment:      PRINCIPAL DISCHARGE DIAGNOSIS  Diagnosis: Acute heart failure with preserved ejection fraction (HFpEF)  Assessment and Plan of Treatment: You were admitted to the cardiac telemetry floor with congestive heart failure likely due to pacemaker battery at end of life. Due to this it was pacing the heart very slowly, likely causing decreased cardiac output and caused the accumulation of fluid from congestive heart failure. You were given intravenous Lasix for 1 day to get rid of the fluid in your lungs and body to help you breathe better. This was resolved after your pacemaker battery was changes. You do NOT need further diuretic medications at this time. You had an echocardiogram performed that showed your heart muscle is normal at 55-60%.      SECONDARY DISCHARGE DIAGNOSES  Diagnosis: Pacemaker generator end of life  Assessment and Plan of Treatment: Your pacemaker was found to be at end of life and was pacing the heart very slowly, likely causing the accumulation of fluid from congestive heart failure. Your pacemaker battery was changed with Dr Chairez on 1/27/2022. You may follow up with your Electrophysiologist at [facility] or at [facility] with Dr Chairez.    Diagnosis: Pacemaker pocket hematoma  Assessment and Plan of Treatment: You developed a blood collection at the pacemaker site after resuming on blood thinner medication (Eliquis) after the battery was changed. Due to this, Eliquis was held for 48 hours, the site remained stable and Eliquis was resumed on 1/31/2022.     PRINCIPAL DISCHARGE DIAGNOSIS  Diagnosis: Acute heart failure with preserved ejection fraction (HFpEF)  Assessment and Plan of Treatment: You were admitted to the cardiac telemetry floor with congestive heart failure likely due to pacemaker battery at end of life. Due to this it was pacing the heart very slowly, likely causing decreased cardiac output and caused the accumulation of fluid from congestive heart failure. You were given intravenous Lasix for 1 day to get rid of the fluid in your lungs and body to help you breathe better. This was resolved after your pacemaker battery was changes. You do NOT need further diuretic medications at this time. You had an echocardiogram performed that showed your heart muscle is normal at 55-60%.      SECONDARY DISCHARGE DIAGNOSES  Diagnosis: Pacemaker generator end of life  Assessment and Plan of Treatment: Your pacemaker was found to be at end of life and was pacing the heart very slowly, likely causing the accumulation of fluid from congestive heart failure. Your [employer] pacemaker battery was changed with Dr Chairez on 1/27/2022. You may follow up with your Electrophysiologist at [facility] or at [facility] with Dr Chairez.    Diagnosis: Pacemaker pocket hematoma  Assessment and Plan of Treatment: You developed a blood collection at the pacemaker site after resuming on blood thinner medication (Eliquis) after the battery was changed. Due to this, Eliquis was held for 48 hours, the site remained stable and Eliquis was resumed on 1/31/2022.

## 2022-01-29 NOTE — DISCHARGE NOTE PROVIDER - NSDCFUADDINST_GEN_ALL_CORE_FT
For the next 4 weeks:   It is very important that you avoid any strenuous activities and heavy lifting (more than 5 pounds) with your left arm for 1 month. This includes pushing and pulling with your left arm. Your range of motion activity needs to be restricted to raising arm no higher than the shoulder level. Do not reach up with your left arm. We encourage you to do passive range of motion exercises with your left arm to avoid a very painful frozen shoulder.     Monitor your left chest incision site for any increase in swelling, redness, drainage, bleeding, and increase in pain despite tylenol. Call the office at [phone] with any questions or concerns and we can guide you to appropriate level of care.    There is a medical glue covering your incision site. This glue with flake off on its own in the next few weeks. Do not pick or peel this glue, as this would increase your risk for infection. Do not apply any ointments, creams or powders to the incision site. Leave it open to air.    You may shower normally. Do not scrub at the incision site. Do not apply direct water pressure to the site. Do not submerge the incision site in water, such as pool, bath or jacuzzi for 1 month.

## 2022-01-29 NOTE — PROGRESS NOTE ADULT - ASSESSMENT
81F with HTN, HLD, mild pulm HTN, Afib/Aflutter (on Eliquis), SSS s/p PPM, transferred from [facility] ED to [facility] cardiac tele with luís LE edema x 2 weeks. PPM found to be at EOL likely contributing to R sided HF. Now s/p PPM generator change 1/27 and IV diuresis x 1day. PT rec BRITTANIE, but pt and family hesitant. 81F with HTN, HLD, mild pulm HTN, Afib/Aflutter (on Eliquis), SSS s/p PPM, transferred from [facility] ED to [facility] cardiac tele with luís LE edema x 2 weeks. PPM found to be at EOL likely contributing to R sided HF. Now s/p PPM generator change 1/27 and IV diuresis x 1day w/ resolution. PT rec BRITTANIE, but pt and family hesitant.  81F with HTN, HLD, mild pulm HTN, Afib/Aflutter (on Eliquis), SSS s/p PPM, transferred from [facility] ED to [facility] cardiac tele with luís LE edema x 2 weeks. PPM found to be at EOL likely contributing to R sided HF. Now s/p PPM generator change 1/27 and IV diuresis x 1day w/ resolution. PT rec BRITTANIE, awaiting placement.

## 2022-01-29 NOTE — PROGRESS NOTE ADULT - PROBLEM SELECTOR PLAN 1
C/o diogenes LE swelling x 2 weeks a/w CHATTERJEE. CXR w/ cardiomegaly. BNP 3525. EKG nonischemic.  -Etiology likely iatrogenic cause from PPM EOL status and VVI pacing at 50bpm w/ AV dissociation and decrease CO  -S/p IV Lasix 40mg x 1 day  -No further need for diuresis, now euvolemic  -ECHO 1/27: EF 55-60%, RV overload, severely dilated RV, biatrial enlargement, mild-moderate MR, severe TR, PASP 19.  -Diogenes LE US 1/26: negative for DVT above the knee bilaterally. Nonvisualization of the bilateral peroneal and posterior tibial veins secondary to marked subcutaneous edema.

## 2022-01-29 NOTE — DISCHARGE NOTE PROVIDER - NSDCMRMEDTOKEN_GEN_ALL_CORE_FT
cholecalciferol 25 mcg (1000 intl units) oral tablet: 1 tab(s) orally once a day  Eliquis 2.5 mg oral tablet: 1 tab(s) orally 2 times a day  FLUoxetine 40 mg oral capsule: 1 cap(s) orally once a day  Lipitor 10 mg oral tablet: 1 tab(s) orally once a day  Metoprolol Succinate ER 25 mg oral tablet, extended release: 1 tab(s) orally once a day   acetaminophen 325 mg oral tablet: 2 tab(s) orally every 6 hours, As needed, Temp greater or equal to 38C (100.4F), Mild Pain (1 - 3)  cholecalciferol 25 mcg (1000 intl units) oral tablet: 1 tab(s) orally once a day  Eliquis 2.5 mg oral tablet: 1 tab(s) orally 2 times a day  FLUoxetine 40 mg oral capsule: 1 cap(s) orally once a day  Lipitor 10 mg oral tablet: 1 tab(s) orally once a day  Metoprolol Succinate ER 25 mg oral tablet, extended release: 1 tab(s) orally once a day

## 2022-01-29 NOTE — DISCHARGE NOTE PROVIDER - HOSPITAL COURSE
81F with HTN, HLD, mild pulm HTN, Afib/Aflutter (on Eliquis), SSS s/p PPM, transferred from [facility] ED to [facility] cardiac tele with luís LE edema x 2 weeks. PPM found to be at EOL likely contributing to R sided HF. Now s/p PPM generator change 1/27 and IV diuresis x 1day w/ resolution. PT rec BRITTANIE, awaiting placement. 81F with HTN, HLD, mild pulm HTN, Afib/Aflutter (on Eliquis), SSS s/p PPM, transferred from [facility] ED to [facility] cardiac tele with luís LE edema x 2 weeks. PPM found to be at EOL likely contributing to R sided HF. Now s/p PPM generator change 1/27 and IV diuresis x 1day w/ resolution. PT rec BRITTANIE, awaiting placement. 81F with HTN, HLD, mild pulm HTN, Afib/Aflutter (on Eliquis), SSS s/p PPM, transferred from [facility] ED to [facility] cardiac tele with diogenes LE edema x 2 weeks. BNP 3525. Trop negx 1. Pt found to be in acute HFpEF exacerbation likely 2/2 PPM at EOL & VVI pacing 50bpm with AV dissociation. Pt was diuresed w/ IV Lasix 40mg x1 and s/p Ron[employer] Sci PPM generator change on 1/27 w/ Dr Chairez. Pt reached euvolemia and did not require further diuresis. CXR post procedure w/o PTX. PPM site developed a small pocket hematoma after Eliquis was resumed. Eliquis was held for 48hrs w/ compressive dressing applied. EP reevaluated PPM site and site remained stable and cleared to resumed Eliquis on 1/31/22. ECHO 1/27/22: EF 55-60%, RV overload, severely dilated RV, biatrial enlargement, mild-moderate MR, severe TR, PASP 19. Diogenes LE US 1/26: negative for DVT above the knee bilaterally. Nonvisualization of the bilateral peroneal and posterior tibial veins secondary to marked subcutaneous edema.     PT eval rec BRITTANIE. pt agreed to short term BRITTANIE and then to live with daughter in [place]. On the day of discharge, the patient was seen and examined. Symptoms improved. Vital signs are stable. Labs and imaging reviewed. Patient is medically optimized and hemodynamically stable. Return precautions discussed, medication teach back done, and importance of physician followup emphasized. The patient and daughter verbalized understanding.

## 2022-01-29 NOTE — DISCHARGE NOTE PROVIDER - CARE PROVIDER_API CALL
Tracy Harp  [address] [place] [address]  Primary Care Physician  Phone: [phone]  Fax: (   )    -  Established Patient  Follow Up Time: Routine   Dontae Chairez)  Cardiac Electrophysiology; Cardiovascular Disease  [address] [place] [address]  Phone: [phone]  Fax: [phone]  Follow Up Time: 1 month    Tracy Harp  [address] [place] [address]  Primary Care Physician  Phone: [phone]  Fax: (   )    -  Established Patient  Follow Up Time: Routine    Demond Kelly)  Cardiovascular Disease; Internal Medicine; Interventional Cardiology  [address] [place] [address], [address]  [place] [address]  Phone: [phone]  Fax: [phone]  Follow Up Time: 1 week   Dontae Chairez)  Cardiac Electrophysiology; Cardiovascular Disease  [address] [place] [address]  Phone: [phone]  Fax: [phone]  Follow Up Time: 1 month    Demond Kelly)  Cardiovascular Disease; Internal Medicine; Interventional Cardiology  [address] [place] [address], [address]  [place] [address]  Phone: [phone]  Fax: [phone]  Follow Up Time: 1 week    Tracy Harp  [address] [place] [id number]  Primary Care Physician  Phone: [phone]  Fax: (   )    -  Established Patient  Scheduled Appointment: 02/15/2022 11:00 AM

## 2022-01-29 NOTE — CONSULT NOTE ADULT - PROBLEM SELECTOR RECOMMENDATION 9
Not tense and with intact wound closure; will likely reabsorb with time. Started in the setting of reinitiation of Eliquis.    -hold Eliquis for 72 hrs; if still in the hospital 1/31, please ask EP to evaluate the site; if being discharged, please have her call for an EP appointment to check the site within 1 week  -I placed a mild pressure dressing over the pocket to promote reabsorption (can remove in 48 hours)  -avoid large range of motion and heavy lifting with left arm

## 2022-01-29 NOTE — DISCHARGE NOTE PROVIDER - CARE PROVIDERS DIRECT ADDRESSES
,DirectAddress_Unknown ,[email or web address],DirectAddress_Unknown,DirectAddress_Unknown

## 2022-01-29 NOTE — CONSULT NOTE ADULT - SUBJECTIVE AND OBJECTIVE BOX
Interval events:  -informed of concern for swelling at implant site after restarting Eliquis    Subjective:  Pt reports mild soreness at procedure site, but no chest pain, SOB, palpitations, dizziness, or any discharge/bleeding    MEDICATIONS  (STANDING):  atorvastatin 10 milliGRAM(s) Oral at bedtime  cholecalciferol 1000 Unit(s) Oral daily  FLUoxetine 40 milliGRAM(s) Oral daily  influenza  Vaccine (HIGH DOSE) 0.7 milliLiter(s) IntraMuscular once  metoprolol succinate ER 25 milliGRAM(s) Oral daily    MEDICATIONS  (PRN):  acetaminophen     Tablet .. 650 milliGRAM(s) Oral every 6 hours PRN Temp greater or equal to 38C (100.4F), Mild Pain (1 - 3)  aluminum hydroxide/magnesium hydroxide/simethicone Suspension 30 milliLiter(s) Oral every 4 hours PRN Dyspepsia  melatonin 3 milliGRAM(s) Oral at bedtime PRN Insomnia  ondansetron Injectable 4 milliGRAM(s) IV Push every 8 hours PRN Nausea and/or Vomiting    Vital Signs Last 24 Hrs  T(C): 36.6 (29 Jan 2022 09:29), Max: 36.6 (29 Jan 2022 09:29)  T(F): 97.9 (29 Jan 2022 09:29), Max: 97.9 (29 Jan 2022 09:29)  HR: 71 (29 Jan 2022 10:47) (64 - 73)  BP: 118/61 (29 Jan 2022 10:47) (118/61 - 150/73)  BP(mean): 83 (29 Jan 2022 10:47) (83 - 83)  RR: 16 (29 Jan 2022 10:47) (16 - 18)  SpO2: 96% (29 Jan 2022 10:47) (95% - 98%)    PE:  Chest: left sided device implant site with moderate swelling over the device pocket, warmth, and mild tenderness, but not tense and with intact wound closure; no drainage/bleeding

## 2022-01-29 NOTE — PROGRESS NOTE ADULT - PROBLEM SELECTOR PLAN 5
-Continue Lipitor 10mg daily  -F/u Lipid profile    F: No IVF  N: DASH/TLC/DM diet  E: Replete lytes PRN K<4, Mg<2  P: DVT PPX: on Eliquis  C: FULL CODE  Dispo: Medically stable for DC, needs safe dispo. No AM labs needs pending dispo.  PT/OT rec BRITTANIE, but pt and family is hesitant. Daughter Heydi Osborn wants pt to be D/C to her house in [place] -Continue Lipitor 10mg daily  -F/u Lipid profile    F: No IVF  N: DASH/TLC/DM diet  E: Replete lytes PRN K<4, Mg<2  P: DVT PPX: Eliquis on hold  C: FULL CODE  Dispo: Medically stable for DC, needs safe dispo. No AM labs needs pending dispo.  -PT/OT rec BRITTANIE, pt initially hesitant but now agrees to BRITTANIE short term and transition to daughter Heydi Osborn's  house in [place] after

## 2022-01-29 NOTE — PROGRESS NOTE ADULT - PROBLEM SELECTOR PLAN 2
[employer] PPM was at EOL upon EP interrogation w/ VVI Pacing 50s  -S/p Ronnie Sci PPM generator change 1/27 w/ Dr Chairez, now A-Pacing 70-80s  -Pt may follow up with [facility] EP or [facility] per her preference [employer] PPM was at EOL upon EP interrogation w/ VVI Pacing 50s  -S/p Ronnie Sci PPM generator change 1/27 w/ Dr Chairez, now A-Pacing 70-80s  -Pt may follow up with [facility] per her preference

## 2022-01-30 DIAGNOSIS — E11.9 TYPE 2 DIABETES MELLITUS WITHOUT COMPLICATIONS: ICD-10-CM

## 2022-01-30 LAB
ANION GAP SERPL CALC-SCNC: 12 MMOL/L — SIGNIFICANT CHANGE UP (ref 5–17)
APPEARANCE UR: CLEAR — SIGNIFICANT CHANGE UP
BACTERIA # UR AUTO: PRESENT /HPF
BASOPHILS # BLD AUTO: 0.05 K/UL — SIGNIFICANT CHANGE UP (ref 0–0.2)
BASOPHILS NFR BLD AUTO: 0.9 % — SIGNIFICANT CHANGE UP (ref 0–2)
BILIRUB UR-MCNC: NEGATIVE — SIGNIFICANT CHANGE UP
BUN SERPL-MCNC: 25 MG/DL — HIGH (ref 7–23)
CALCIUM SERPL-MCNC: 9.6 MG/DL — SIGNIFICANT CHANGE UP (ref 8.4–10.5)
CHLORIDE SERPL-SCNC: 106 MMOL/L — SIGNIFICANT CHANGE UP (ref 96–108)
CO2 SERPL-SCNC: 24 MMOL/L — SIGNIFICANT CHANGE UP (ref 22–31)
COLOR SPEC: YELLOW — SIGNIFICANT CHANGE UP
CREAT SERPL-MCNC: 1.03 MG/DL — SIGNIFICANT CHANGE UP (ref 0.5–1.3)
DIFF PNL FLD: NEGATIVE — SIGNIFICANT CHANGE UP
EOSINOPHIL # BLD AUTO: 0.09 K/UL — SIGNIFICANT CHANGE UP (ref 0–0.5)
EOSINOPHIL NFR BLD AUTO: 1.5 % — SIGNIFICANT CHANGE UP (ref 0–6)
EPI CELLS # UR: ABNORMAL /HPF (ref 0–5)
GLUCOSE BLDC GLUCOMTR-MCNC: 89 MG/DL — SIGNIFICANT CHANGE UP (ref 70–99)
GLUCOSE BLDC GLUCOMTR-MCNC: 95 MG/DL — SIGNIFICANT CHANGE UP (ref 70–99)
GLUCOSE SERPL-MCNC: 93 MG/DL — SIGNIFICANT CHANGE UP (ref 70–99)
GLUCOSE UR QL: NEGATIVE — SIGNIFICANT CHANGE UP
HCT VFR BLD CALC: 41.5 % — SIGNIFICANT CHANGE UP (ref 34.5–45)
HGB BLD-MCNC: 13 G/DL — SIGNIFICANT CHANGE UP (ref 11.5–15.5)
IMM GRANULOCYTES NFR BLD AUTO: 0.2 % — SIGNIFICANT CHANGE UP (ref 0–1.5)
KETONES UR-MCNC: ABNORMAL MG/DL
LEUKOCYTE ESTERASE UR-ACNC: NEGATIVE — SIGNIFICANT CHANGE UP
LYMPHOCYTES # BLD AUTO: 0.84 K/UL — LOW (ref 1–3.3)
LYMPHOCYTES # BLD AUTO: 14.3 % — SIGNIFICANT CHANGE UP (ref 13–44)
MAGNESIUM SERPL-MCNC: 2 MG/DL — SIGNIFICANT CHANGE UP (ref 1.6–2.6)
MCHC RBC-ENTMCNC: 31 PG — SIGNIFICANT CHANGE UP (ref 27–34)
MCHC RBC-ENTMCNC: 31.3 GM/DL — LOW (ref 32–36)
MCV RBC AUTO: 99 FL — SIGNIFICANT CHANGE UP (ref 80–100)
MONOCYTES # BLD AUTO: 0.59 K/UL — SIGNIFICANT CHANGE UP (ref 0–0.9)
MONOCYTES NFR BLD AUTO: 10.1 % — SIGNIFICANT CHANGE UP (ref 2–14)
NEUTROPHILS # BLD AUTO: 4.29 K/UL — SIGNIFICANT CHANGE UP (ref 1.8–7.4)
NEUTROPHILS NFR BLD AUTO: 73 % — SIGNIFICANT CHANGE UP (ref 43–77)
NITRITE UR-MCNC: NEGATIVE — SIGNIFICANT CHANGE UP
NRBC # BLD: 0 /100 WBCS — SIGNIFICANT CHANGE UP (ref 0–0)
PH UR: 7 — SIGNIFICANT CHANGE UP (ref 5–8)
PLATELET # BLD AUTO: 250 K/UL — SIGNIFICANT CHANGE UP (ref 150–400)
POTASSIUM SERPL-MCNC: 4.2 MMOL/L — SIGNIFICANT CHANGE UP (ref 3.5–5.3)
POTASSIUM SERPL-SCNC: 4.2 MMOL/L — SIGNIFICANT CHANGE UP (ref 3.5–5.3)
PROT UR-MCNC: ABNORMAL MG/DL
RBC # BLD: 4.19 M/UL — SIGNIFICANT CHANGE UP (ref 3.8–5.2)
RBC # FLD: 14.9 % — HIGH (ref 10.3–14.5)
RBC CASTS # UR COMP ASSIST: < 5 /HPF — SIGNIFICANT CHANGE UP
SODIUM SERPL-SCNC: 142 MMOL/L — SIGNIFICANT CHANGE UP (ref 135–145)
SP GR SPEC: 1.01 — SIGNIFICANT CHANGE UP (ref 1–1.03)
UROBILINOGEN FLD QL: 2 E.U./DL
WBC # BLD: 5.87 K/UL — SIGNIFICANT CHANGE UP (ref 3.8–10.5)
WBC # FLD AUTO: 5.87 K/UL — SIGNIFICANT CHANGE UP (ref 3.8–10.5)
WBC UR QL: < 5 /HPF — SIGNIFICANT CHANGE UP

## 2022-01-30 PROCEDURE — 71045 X-RAY EXAM CHEST 1 VIEW: CPT | Mod: 26

## 2022-01-30 PROCEDURE — 99233 SBSQ HOSP IP/OBS HIGH 50: CPT

## 2022-01-30 RX ORDER — DEXTROSE 50 % IN WATER 50 %
25 SYRINGE (ML) INTRAVENOUS ONCE
Refills: 0 | Status: DISCONTINUED | OUTPATIENT
Start: 2022-01-30 | End: 2022-01-31

## 2022-01-30 RX ORDER — GLUCAGON INJECTION, SOLUTION 0.5 MG/.1ML
1 INJECTION, SOLUTION SUBCUTANEOUS ONCE
Refills: 0 | Status: DISCONTINUED | OUTPATIENT
Start: 2022-01-30 | End: 2022-01-31

## 2022-01-30 RX ORDER — DEXTROSE 50 % IN WATER 50 %
15 SYRINGE (ML) INTRAVENOUS ONCE
Refills: 0 | Status: DISCONTINUED | OUTPATIENT
Start: 2022-01-30 | End: 2022-01-31

## 2022-01-30 RX ORDER — INSULIN LISPRO 100/ML
VIAL (ML) SUBCUTANEOUS
Refills: 0 | Status: DISCONTINUED | OUTPATIENT
Start: 2022-01-30 | End: 2022-01-31

## 2022-01-30 RX ORDER — DEXTROSE 50 % IN WATER 50 %
12.5 SYRINGE (ML) INTRAVENOUS ONCE
Refills: 0 | Status: DISCONTINUED | OUTPATIENT
Start: 2022-01-30 | End: 2022-01-31

## 2022-01-30 RX ORDER — SODIUM CHLORIDE 9 MG/ML
1000 INJECTION, SOLUTION INTRAVENOUS
Refills: 0 | Status: DISCONTINUED | OUTPATIENT
Start: 2022-01-30 | End: 2022-01-31

## 2022-01-30 RX ADMIN — Medication 1000 UNIT(S): at 12:10

## 2022-01-30 RX ADMIN — Medication 25 MILLIGRAM(S): at 12:09

## 2022-01-30 RX ADMIN — Medication 40 MILLIGRAM(S): at 12:10

## 2022-01-30 NOTE — PROGRESS NOTE ADULT - PROBLEM SELECTOR PLAN 3
h/o paroxysmal AF and paroxysmal Aflutter.   - c/w home Toprol XL 25 mg PO QD  - home Eliquis 2.5mg BID on hold pending EP evaluation 1/31 AM.

## 2022-01-30 NOTE — CHART NOTE - NSCHARTNOTEFT_GEN_A_CORE
Called to pt's bedside earlier as pt appears more confused. Alert to self/date/situation but believes she is in "housing." Aware that she recently got her pacemaker changed without prompting. Aware of her daughter's name. No focal neuro deficits. No leukocytosis on CBC. Afebrile. Patient recently c/o urinary hesitancy per daughter's report. Awaiting UA. CXR to r/o infiltrates.
CALI DRAKE  [id number]    PROCEDURE:  Dual chamber PPM generator change    INDICATION:  PPM battery EOL    ELECTROPHYSIOLOGIST(S):  MD Ayad Brannon MD    ANESTHESIOLOGY:  None    FINDINGS:  Successful dual chamber PPM generator change    COMPLICATIONS:  None    RECOMMENDATIONS:  Hold AC tonight

## 2022-01-30 NOTE — PROGRESS NOTE ADULT - PROBLEM SELECTOR PLAN 2
[employer] PPM was at EOL upon EP interrogation w/ VVI Pacing 50s  - S/p Ronnie Sci PPM generator change 1/27 w/ Dr Chairez, now A-Pacing 70-80s  - Pt may follow up with [facility] EP per her preference  - home Eliquis 2.5mg BID on hold pending EP evaluation 1/31 AM.

## 2022-01-30 NOTE — PROGRESS NOTE ADULT - PROBLEM SELECTOR PLAN 1
CTA, no pedal edema B/L, saturating well on RA. Net negative 5.9L since admit.   - Trop neg x2  - Etiology likely iatrogenic cause from PPM EOL status and VVI pacing at 50bpm w/ AV dissociation and decrease CO  - ECHO 1/27: EF 55-60%, RV overload, severely dilated RV, biatrial enlargement, mild-moderate MR, severe TR, PASP 19.  - B/L LE venous duplex 1/26: negative for DVT above the knee B/L, nonvisualization of the bilateral peroneal and posterior tibial veins secondary to marked subcutaneous edema.  - s/p IV Lasix 40mg x 1 day, no euvolemic, no need for further diuresis as PPM setting adjusted

## 2022-01-30 NOTE — PROGRESS NOTE ADULT - PROBLEM SELECTOR PLAN 4
HgbA1C 7.3%, new diagnosis  - Patient to follow up with PMD as outpatient for initiation of hypoglycemic medication  - Nutrition consult placed for education HgbA1C 7.3%, does not take any medication  - Patient to follow up with PMD as outpatient for initiation of hypoglycemic medication  - Nutrition consult placed for education

## 2022-01-30 NOTE — PROGRESS NOTE ADULT - SUBJECTIVE AND OBJECTIVE BOX
Interventional Cardiology PA Adult Progress Note    Subjective Assessment:  Pt seen and evaluated at bedside this AM. A&O x 2-3. Felt like she was going to fall down today but denied dizziness. Denies CP, SOB, palpitations, N/V, abdominal pain. All other ROS otherwise negative except per subjective.   	  MEDICATIONS:  metoprolol succinate ER 25 milliGRAM(s) Oral daily  acetaminophen     Tablet .. 650 milliGRAM(s) Oral every 6 hours PRN  FLUoxetine 40 milliGRAM(s) Oral daily  melatonin 3 milliGRAM(s) Oral at bedtime PRN  ondansetron Injectable 4 milliGRAM(s) IV Push every 8 hours PRN  aluminum hydroxide/magnesium hydroxide/simethicone Suspension 30 milliLiter(s) Oral every 4 hours PRN  atorvastatin 10 milliGRAM(s) Oral at bedtime  cholecalciferol 1000 Unit(s) Oral daily  influenza  Vaccine (HIGH DOSE) 0.7 milliLiter(s) IntraMuscular once      [PHYSICAL EXAM:  TELEMETRY:  T(C): 36 (01-30-22 @ 05:46), Max: 36.9 (01-29-22 @ 16:41)  HR: 70 (01-30-22 @ 08:44) (70 - 79)  BP: 142/71 (01-30-22 @ 08:44) (112/56 - 142/71)  RR: 18 (01-30-22 @ 08:44) (16 - 18)  SpO2: 97% (01-30-22 @ 08:44) (95% - 97%)  Wt(kg): --  I&O's Summary    29 Jan 2022 07:01  -  30 Jan 2022 07:00  --------------------------------------------------------  IN: 240 mL / OUT: 0 mL / NET: 240 mL                      Appearance: Elderly woman laying in hospital bed in NAD	  HEENT: EOMI	  Neck:  - JVD  Cardiovascular: Normal S1 S2, No murmurs  Chest: pressure dressing to L chest, mild TTP, no hematoma palpated  Respiratory: CTA B/L, no w/r/r  Gastrointestinal:  Soft, Non-tender, + BS	x4  Skin: No rashes, No ecchymoses, No cyanosis  Extremities: No pedal edema B/L  Neurologic: Non-focal  Psychiatry: A & O x 2-3 (self/date, says we are in "housing"), Mood & affect appropriate

## 2022-01-30 NOTE — PROGRESS NOTE ADULT - ASSESSMENT
81F with HTN, HLD, mild pulm HTN, Afib/Aflutter (on Eliquis), SSS s/p PPM, transferred from [facility] ED to [facility] cardiac tele with luís LE edema x 2 weeks and found to be in acute HFpEF exacerbation 2/2 PPM at EOL & VVI pacing with AV dissociation, s/p IV diuresis, s/p PPM gen change on 1/27, medically cleared for BRITTANIE and awaiting placement.    81F with HTN, HLD, mild pulm HTN, Afib/Aflutter (on Eliquis), SSS s/p PPM, DM (not on medication) who was transferred from [facility] ED to [facility] cardiac tele with luís LE edema x 2 weeks and found to be in acute HFpEF exacerbation 2/2 PPM at EOL & VVI pacing with AV dissociation, s/p IV diuresis, s/p PPM gen change on 1/27, medically cleared for BRITTANIE and awaiting placement.

## 2022-01-31 ENCOUNTER — TRANSCRIPTION ENCOUNTER (OUTPATIENT)
Age: 82
End: 2022-01-31

## 2022-01-31 VITALS — TEMPERATURE: 99 F

## 2022-01-31 LAB
GLUCOSE BLDC GLUCOMTR-MCNC: 137 MG/DL — HIGH (ref 70–99)
GLUCOSE BLDC GLUCOMTR-MCNC: 93 MG/DL — SIGNIFICANT CHANGE UP (ref 70–99)
SARS-COV-2 RNA SPEC QL NAA+PROBE: NEGATIVE — SIGNIFICANT CHANGE UP

## 2022-01-31 PROCEDURE — 85025 COMPLETE CBC W/AUTO DIFF WBC: CPT

## 2022-01-31 PROCEDURE — 81001 URINALYSIS AUTO W/SCOPE: CPT

## 2022-01-31 PROCEDURE — 97116 GAIT TRAINING THERAPY: CPT

## 2022-01-31 PROCEDURE — 80048 BASIC METABOLIC PNL TOTAL CA: CPT

## 2022-01-31 PROCEDURE — 84443 ASSAY THYROID STIM HORMONE: CPT

## 2022-01-31 PROCEDURE — 85027 COMPLETE CBC AUTOMATED: CPT

## 2022-01-31 PROCEDURE — 71045 X-RAY EXAM CHEST 1 VIEW: CPT

## 2022-01-31 PROCEDURE — 93005 ELECTROCARDIOGRAM TRACING: CPT

## 2022-01-31 PROCEDURE — 93306 TTE W/DOPPLER COMPLETE: CPT

## 2022-01-31 PROCEDURE — 85610 PROTHROMBIN TIME: CPT

## 2022-01-31 PROCEDURE — 80053 COMPREHEN METABOLIC PANEL: CPT

## 2022-01-31 PROCEDURE — 84484 ASSAY OF TROPONIN QUANT: CPT

## 2022-01-31 PROCEDURE — 93970 EXTREMITY STUDY: CPT

## 2022-01-31 PROCEDURE — 83880 ASSAY OF NATRIURETIC PEPTIDE: CPT

## 2022-01-31 PROCEDURE — 87635 SARS-COV-2 COVID-19 AMP PRB: CPT

## 2022-01-31 PROCEDURE — 99239 HOSP IP/OBS DSCHRG MGMT >30: CPT

## 2022-01-31 PROCEDURE — 83036 HEMOGLOBIN GLYCOSYLATED A1C: CPT

## 2022-01-31 PROCEDURE — 36415 COLL VENOUS BLD VENIPUNCTURE: CPT

## 2022-01-31 PROCEDURE — 85730 THROMBOPLASTIN TIME PARTIAL: CPT

## 2022-01-31 PROCEDURE — C1889: CPT

## 2022-01-31 PROCEDURE — 99285 EMERGENCY DEPT VISIT HI MDM: CPT

## 2022-01-31 PROCEDURE — 99231 SBSQ HOSP IP/OBS SF/LOW 25: CPT

## 2022-01-31 PROCEDURE — 97535 SELF CARE MNGMENT TRAINING: CPT

## 2022-01-31 PROCEDURE — 80061 LIPID PANEL: CPT

## 2022-01-31 PROCEDURE — 82962 GLUCOSE BLOOD TEST: CPT

## 2022-01-31 PROCEDURE — 97161 PT EVAL LOW COMPLEX 20 MIN: CPT

## 2022-01-31 PROCEDURE — 83735 ASSAY OF MAGNESIUM: CPT

## 2022-01-31 PROCEDURE — C1785: CPT

## 2022-01-31 RX ORDER — APIXABAN 2.5 MG/1
2.5 TABLET, FILM COATED ORAL EVERY 12 HOURS
Refills: 0 | Status: DISCONTINUED | OUTPATIENT
Start: 2022-01-31 | End: 2022-01-31

## 2022-01-31 RX ORDER — ACETAMINOPHEN 500 MG
2 TABLET ORAL
Qty: 0 | Refills: 0 | DISCHARGE
Start: 2022-01-31

## 2022-01-31 RX ADMIN — Medication 40 MILLIGRAM(S): at 11:22

## 2022-01-31 RX ADMIN — APIXABAN 2.5 MILLIGRAM(S): 2.5 TABLET, FILM COATED ORAL at 12:44

## 2022-01-31 RX ADMIN — Medication 1000 UNIT(S): at 11:22

## 2022-01-31 RX ADMIN — Medication 25 MILLIGRAM(S): at 06:36

## 2022-01-31 NOTE — PROGRESS NOTE ADULT - PROBLEM SELECTOR PLAN 5
-Continue Lipitor 10mg daily  -F/u Lipid profile    F: No IVF  N: DASH/TLC/DM diet  E: Replete lytes PRN K<4, Mg<2  P: DVT PPX: Eliquis on hold  C: FULL CODE  Dispo: Medically stable for DC, needs safe dispo. No AM labs needs pending dispo.  -PT/OT rec BRITTANIE, pt initially hesitant but now agrees to BRITTANIE short term and transition to daughter Heydi Osborn's  house in [place] after -150s  -Continue Toprol 25mg daily

## 2022-01-31 NOTE — PROGRESS NOTE ADULT - ASSESSMENT
81F with HTN, HLD, mild pulm HTN, Afib/Aflutter (on Eliquis), SSS s/p PPM, transferred from [facility] ED to [facility] cardiac tele with luís LE edema x 2 weeks. PPM found to be at EOL likely contributing to R sided HF. Now s/p PPM generator change 1/27 and IV diuresis x 1day w/ resolution. PT rec BRITTANIE, awaiting placement. 81F with HTN, HLD, mild pulm HTN, Afib/Aflutter (on Eliquis), SSS s/p PPM, DM (not on medication) who was transferred from [facility] ED to [facility] cardiac tele with luís LE edema x 2 weeks and found to be in acute HFpEF exacerbation 2/2 PPM at EOL & VVI pacing with AV dissociation, s/p IV diuresis, s/p PPM gen change on 1/27 c/b pocket hematoma (Eliquis held x 48hrs). Medically cleared for BRITTANIE and awaiting placement.

## 2022-01-31 NOTE — PROGRESS NOTE ADULT - PROBLEM SELECTOR PLAN 3
Hx of PAF and PAflutter.   -c/w home Eliquis 2.5mg BID  -Resume home Toprol 25mg qd now s/p PPM gen change Hx of PAF and PAflutter. Currently SR  -c/w home Eliquis 2.5mg BID and home Toprol 25mg qd

## 2022-01-31 NOTE — DIETITIAN INITIAL EVALUATION ADULT. - PERTINENT MEDS FT
corrective sliding scale, tylenols, Lipitor, TOPROL XL, aluminum hydroxide/magnesium hydroxide/simethicone Suspension, D3, Zofran

## 2022-01-31 NOTE — PROGRESS NOTE ADULT - PROVIDER SPECIALTY LIST ADULT
Electrophysiology
Cardiology
Electrophysiology
Intervent Cardiology
Electrophysiology
Cardiology
Cardiology

## 2022-01-31 NOTE — PROGRESS NOTE ADULT - PROBLEM SELECTOR PLAN 4
-150s  -Continue Toprol 25mg daily HgbA1C 7.3%, does not take any DM medication  - Patient to follow up with PMD as outpatient for initiation of hypoglycemic medication  - Nutrition consult placed for education.

## 2022-01-31 NOTE — DISCHARGE NOTE NURSING/CASE MANAGEMENT/SOCIAL WORK - NSDCPEFALRISK_GEN_ALL_CORE
For information on Fall & Injury Prevention, visit: [email or web address] OR  [email or web address] OR  https://www.cdc.gov/steadi/patient.html

## 2022-01-31 NOTE — PROGRESS NOTE ADULT - SUBJECTIVE AND OBJECTIVE BOX
CARDIOLOGY NP PROGRESS NOTE    Subjective: Pt seen and examined at bedside. Reports feeling well. Denies chest pain, sob, lightheadedness, dizziness, palpitations, fever, chills.  Remainder ROS otherwise negative.    Overnight Events: None    TELEMETRY: SR 70s       VITAL SIGNS:  T(C): 36.8 (01-31-22 @ 09:01), Max: 37.4 (01-31-22 @ 06:16)  HR: 70 (01-31-22 @ 11:52) (70 - 71)  BP: 141/80 (01-31-22 @ 11:52) (128/84 - 152/74)  RR: 18 (01-31-22 @ 10:05) (16 - 18)  SpO2: 94% (01-31-22 @ 10:05) (94% - 95%)  Wt(kg): --    I&O's Summary    30 Jan 2022 07:01  -  31 Jan 2022 07:00  --------------------------------------------------------  IN: 0 mL / OUT: 550 mL / NET: -550 mL    31 Jan 2022 07:01  -  31 Jan 2022 12:59  --------------------------------------------------------  IN: 0 mL / OUT: 0 mL / NET: 0 mL          PHYSICAL EXAM:    General: A/ox 3, No acute Distress  Neck: Supple, NO JVD  Cardiac: S1 S2, No M/R/G. L chest wall PPM site w/ small pocket hematoma  Pulmonary: CTAB, Breathing unlabored on RA, No Rhonchi/Rales/Wheezing  Abdomen: Soft, Non -tender, +BS x 4 quads  Extremities: No Rashes, Diogenes trace LE edema w/ skin wrinkling, LUE edema localized to elbow appears 2/2 dependent edema  Neuro: A/o x 3, No focal deficits          LABS:                          13.0   5.87  )-----------( 250      ( 30 Jan 2022 18:31 )             41.5                              01-30    142  |  106  |  25<H>  ----------------------------<  93  4.2   |  24  |  1.03    Ca    9.6      30 Jan 2022 18:31  Mg     2.0     01-30                                CAPILLARY BLOOD GLUCOSE      POCT Blood Glucose.: 137 mg/dL (31 Jan 2022 11:57)  POCT Blood Glucose.: 93 mg/dL (31 Jan 2022 07:09)  POCT Blood Glucose.: 95 mg/dL (30 Jan 2022 22:19)  POCT Blood Glucose.: 89 mg/dL (30 Jan 2022 16:54)            Allergies:  No Known Allergies    MEDICATIONS  (STANDING):  apixaban 2.5 milliGRAM(s) Oral every 12 hours  atorvastatin 10 milliGRAM(s) Oral at bedtime  cholecalciferol 1000 Unit(s) Oral daily  dextrose 40% Gel 15 Gram(s) Oral once  dextrose 5%. 1000 milliLiter(s) (50 mL/Hr) IV Continuous <Continuous>  dextrose 5%. 1000 milliLiter(s) (100 mL/Hr) IV Continuous <Continuous>  dextrose 50% Injectable 25 Gram(s) IV Push once  dextrose 50% Injectable 12.5 Gram(s) IV Push once  dextrose 50% Injectable 25 Gram(s) IV Push once  FLUoxetine 40 milliGRAM(s) Oral daily  glucagon  Injectable 1 milliGRAM(s) IntraMuscular once  influenza  Vaccine (HIGH DOSE) 0.7 milliLiter(s) IntraMuscular once  insulin lispro (ADMELOG) corrective regimen sliding scale   SubCutaneous Before meals and at bedtime  metoprolol succinate ER 25 milliGRAM(s) Oral daily    MEDICATIONS  (PRN):  acetaminophen     Tablet .. 650 milliGRAM(s) Oral every 6 hours PRN Temp greater or equal to 38C (100.4F), Mild Pain (1 - 3)  aluminum hydroxide/magnesium hydroxide/simethicone Suspension 30 milliLiter(s) Oral every 4 hours PRN Dyspepsia  melatonin 3 milliGRAM(s) Oral at bedtime PRN Insomnia  ondansetron Injectable 4 milliGRAM(s) IV Push every 8 hours PRN Nausea and/or Vomiting        DIAGNOSTIC TESTS:

## 2022-01-31 NOTE — PROGRESS NOTE ADULT - SUBJECTIVE AND OBJECTIVE BOX
EPS Progress Note    S: patient laying in bed comfortably with no complaints.  She states she feels better today then yesterday    O: T(C): 36.8 (01-31-22 @ 09:01), Max: 37.4 (01-31-22 @ 06:16)  HR: 70 (01-31-22 @ 13:29) (70 - 71)  BP: 138/66 (01-31-22 @ 13:29) (128/84 - 152/74)  RR: 18 (01-31-22 @ 13:29) (16 - 18)  SpO2: 95% (01-31-22 @ 13:29) (94% - 95%)       TELE: paced    PHYSICAL  General:  NAD        Chest:  CTA B/L, no w/r/r  Cardiac:  RRR,    Abdomen:   soft    Extremities: No edema - L chest with soft hematoma, incision healing well. dressing removed   Skin: no rash noted, normal color and pigmentation  Psych:  normal affect and mood  Neuro: no deficit noted     LABS:                        13.0   5.87  )-----------( 250      ( 30 Jan 2022 18:31 )             41.5     01-30    142  |  106  |  25<H>  ----------------------------<  93  4.2   |  24  |  1.03    Ca    9.6      30 Jan 2022 18:31  Mg     2.0     01-30            MEDICATIONS:  acetaminophen     Tablet .. 650 milliGRAM(s) Oral every 6 hours PRN  aluminum hydroxide/magnesium hydroxide/simethicone Suspension 30 milliLiter(s) Oral every 4 hours PRN  atorvastatin 10 milliGRAM(s) Oral at bedtime  FLUoxetine 40 milliGRAM(s) Oral daily  iinsulin lispro (ADMELOG) corrective regimen sliding scale   SubCutaneous Before meals and at bedtime  melatonin 3 milliGRAM(s) Oral at bedtime PRN  metoprolol succinate ER 25 milliGRAM(s) Oral daily  ondansetron Injectable 4 milliGRAM(s) IV Push every 8 hours PRN      ASSESSMENT/PLAN  81F with HTN, HLD, mild pulm HTN, Afib/Aflutter (on Eliquis), SSS s/p PPM, DM (not on medication) who was transferred from [facility] ED to [facility] cardiac tele with luís LE edema x 2 weeks and found to be in acute HFpEF exacerbation 2/2 PPM at EOL & VVI pacing with AV dissociation, s/p IV diuresis, s/p PPM gen change on 1/27 c/b pocket hematoma (Eliquis held x 48hrs). Medically cleared for BRITTANIE and awaiting placement.   -ILR site with small soft hematoma.  Incision healing well.  Would restart home Eliquis.  Dressing removed.  Follow up with Dr. Chairez in 3-4 weeks [phone]

## 2022-01-31 NOTE — DISCHARGE NOTE NURSING/CASE MANAGEMENT/SOCIAL WORK - PATIENT PORTAL LINK FT
You can access the FollowMyHealth Patient Portal offered by [facility] by registering at the following website: [email or web address]. By joining [facility]’s FollowMyHealth portal, you will also be able to view your health information using other applications (apps) compatible with our system.

## 2022-01-31 NOTE — DIETITIAN INITIAL EVALUATION ADULT. - OTHER INFO
80 y/o female with HTN, Pulmonary HTN, Hypercholesterolemia, bradycardia s/p PPM, presented to [facility] with 2 weeks of lower extremities swelling up to the thighs, associating with pain, resulting in difficulty to ambulate. She received Lasix 20mg IV, Tylenol 975mg and transferred to [facility] for further management Lower extremity edema, Pulmonary HTN. ECHO EF 55-60% 1/27. S/p Dual chamber PPM generator change 1/27, c/b pocket hematoma (Eliquis held x 48hrs). Medically cleared for BRITTANIE and awaiting placement - possible for today.     Pt seen sitting up in chair on 5UR. Noted as confused however able to answer some RD questions. Consumed some eggs this AM (did not state exact %PO) and small turkey rolls at lunch today - ordered for DASH consCHO (evening snack diet) with 1000ml FR. Noted A1c 7.3%, Now ordered for corrective sliding scale, ?If new DM hx. No pain. 1+BL ankle edema. No pressure ulcer. German 18.   Please see RD Recs below.

## 2022-01-31 NOTE — PROGRESS NOTE ADULT - PROBLEM SELECTOR PLAN 1
C/o diogenes LE swelling x 2 weeks a/w CHATTERJEE. CXR w/ cardiomegaly. BNP 3525. EKG nonischemic.  -Etiology likely iatrogenic cause from PPM EOL status and VVI pacing at 50bpm w/ AV dissociation and decrease CO  -S/p IV Lasix 40mg x 1 day  -No further need for diuresis, now euvolemic  -ECHO 1/27: EF 55-60%, RV overload, severely dilated RV, biatrial enlargement, mild-moderate MR, severe TR, PASP 19.  -Diogenes LE US 1/26: negative for DVT above the knee bilaterally. Nonvisualization of the bilateral peroneal and posterior tibial veins secondary to marked subcutaneous edema. C/o diogenes LE swelling x 2 weeks a/w CHATTERJEE. CXR w/ cardiomegaly. BNP 3525. Trop neg x2. EKG nonischemic.  -Etiology likely iatrogenic cause from PPM EOL status and VVI pacing at 50bpm w/ AV dissociation and decrease CO  -S/p IV Lasix 40mg x 1 day  -No further need for diuresis, now euvolemic. Net negative >6L since admit.   -ECHO 1/27: EF 55-60%, RV overload, severely dilated RV, biatrial enlargement, mild-moderate MR, severe TR, PASP 19.  -Diogenes LE US 1/26: negative for DVT above the knee bilaterally. Nonvisualization of the bilateral peroneal and posterior tibial veins secondary to marked subcutaneous edema.

## 2022-01-31 NOTE — PROGRESS NOTE ADULT - PROBLEM SELECTOR PLAN 6
- c/w Lipitor 10mg daily  - LDL 46, total chol 100, HDL 41    F: No IVF  N: DASH/TLC/DM diet  E: Replete lytes PRN K<4, Mg<2  P: DVT PPX: Eliquis on hold  C: FULL CODE  Dispo: Medically stable for DC, needs safe dispo. No AM labs needs pending dispo.  -PT/OT rec BRITTANIE, pt initially hesitant but now agrees to BRITTANIE short term and transition to daughter Heydi Osborn's  house in [place] after    case d/w Dr. Garcia
-Continue Lipitor 10mg daily  - LDL 46, total chol 100, HDL 41    F: No IVF  N: DASH/TLC/DM diet  E: Replete lytes PRN K<4, Mg<2  P: DVT PPX: Eliquis   C: FULL CODE  Dispo: Medically stable for DC, needs safe dispo. No AM labs needs pending dispo.  -PT/OT rec BRITTANIE, pt initially hesitant but now agrees to BRITTANIE short term and transition to daughter Heydi Osborn's  house in [place] after

## 2022-01-31 NOTE — PROGRESS NOTE ADULT - PROBLEM SELECTOR PLAN 2
[employer] PPM was at EOL upon EP interrogation w/ VVI Pacing 50s  -S/p [employer] PPM generator change 1/27 w/ Dr Chairez, now A-Pacing 70-80s  -Pt may follow up with [facility] EP per her preference [employer] PPM was at EOL upon EP interrogation w/ VVI Pacing 50s  -S/p [employer] PPM generator change 1/27 w/ Dr Chairez, now A-Pacing 70-80s  -Pt may follow up with [facility] EP per her preference  -Developed small pocket hematoma at PPM site after Eliquis reinitiation post PPM 1/28.  Eliquis held x48hrs and now resumed on 1/31

## 2022-02-01 PROBLEM — I27.20 PULMONARY HYPERTENSION, UNSPECIFIED: Chronic | Status: ACTIVE | Noted: 2022-01-27

## 2022-02-01 PROBLEM — E78.00 PURE HYPERCHOLESTEROLEMIA, UNSPECIFIED: Chronic | Status: ACTIVE | Noted: 2022-01-27

## 2022-02-01 PROBLEM — I10 ESSENTIAL (PRIMARY) HYPERTENSION: Chronic | Status: ACTIVE | Noted: 2022-01-27

## 2022-02-01 PROBLEM — Z95.0 PRESENCE OF CARDIAC PACEMAKER: Chronic | Status: ACTIVE | Noted: 2022-01-26

## 2022-02-01 PROBLEM — F41.9 ANXIETY DISORDER, UNSPECIFIED: Chronic | Status: ACTIVE | Noted: 2022-01-27

## 2022-02-01 PROBLEM — R00.1 BRADYCARDIA, UNSPECIFIED: Chronic | Status: ACTIVE | Noted: 2022-01-26

## 2022-02-10 DIAGNOSIS — I48.0 PAROXYSMAL ATRIAL FIBRILLATION: ICD-10-CM

## 2022-02-10 DIAGNOSIS — I11.0 HYPERTENSIVE HEART DISEASE WITH HEART FAILURE: ICD-10-CM

## 2022-02-10 DIAGNOSIS — I34.0 NONRHEUMATIC MITRAL (VALVE) INSUFFICIENCY: ICD-10-CM

## 2022-02-10 DIAGNOSIS — F32.A DEPRESSION, UNSPECIFIED: ICD-10-CM

## 2022-02-10 DIAGNOSIS — Z45.010 ENCOUNTER FOR CHECKING AND TESTING OF CARDIAC PACEMAKER PULSE GENERATOR [BATTERY]: ICD-10-CM

## 2022-02-10 DIAGNOSIS — I50.31 ACUTE DIASTOLIC (CONGESTIVE) HEART FAILURE: ICD-10-CM

## 2022-02-10 DIAGNOSIS — I36.1 NONRHEUMATIC TRICUSPID (VALVE) INSUFFICIENCY: ICD-10-CM

## 2022-02-10 DIAGNOSIS — R00.1 BRADYCARDIA, UNSPECIFIED: ICD-10-CM

## 2022-02-10 DIAGNOSIS — I48.92 UNSPECIFIED ATRIAL FLUTTER: ICD-10-CM

## 2022-02-10 DIAGNOSIS — Z79.01 LONG TERM (CURRENT) USE OF ANTICOAGULANTS: ICD-10-CM

## 2022-02-10 DIAGNOSIS — F41.9 ANXIETY DISORDER, UNSPECIFIED: ICD-10-CM

## 2022-02-10 DIAGNOSIS — T82.897A OTHER SPECIFIED COMPLICATION OF CARDIAC PROSTHETIC DEVICES, IMPLANTS AND GRAFTS, INITIAL ENCOUNTER: ICD-10-CM

## 2022-02-10 DIAGNOSIS — E78.5 HYPERLIPIDEMIA, UNSPECIFIED: ICD-10-CM

## 2022-02-10 DIAGNOSIS — Z86.711 PERSONAL HISTORY OF PULMONARY EMBOLISM: ICD-10-CM

## 2022-02-10 DIAGNOSIS — I27.20 PULMONARY HYPERTENSION, UNSPECIFIED: ICD-10-CM
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
## 2022-02-10 DIAGNOSIS — E11.9 TYPE 2 DIABETES MELLITUS WITHOUT COMPLICATIONS: ICD-10-CM

## 2022-02-10 DIAGNOSIS — I49.5 SICK SINUS SYNDROME: ICD-10-CM
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
## 2022-02-10 DIAGNOSIS — Z45.018 ENCOUNTER FOR ADJUSTMENT AND MANAGEMENT OF OTHER PART OF CARDIAC PACEMAKER: ICD-10-CM

## 2022-02-15 ENCOUNTER — APPOINTMENT (OUTPATIENT)
Dept: HEART AND VASCULAR | Facility: CLINIC | Age: 82
End: 2022-02-15

## 2022-02-21 NOTE — PHYSICAL THERAPY INITIAL EVALUATION ADULT - GENERAL OBSERVATIONS, REHAB EVAL
To Dr. Moncada, please advise    As per FAIZAN bowling patient cleared for PT/OOB. Received supine + telemetry, heplock, in NAD. C/O bilateral leg pain 6/10, RN aware

## 2022-03-28 PROBLEM — Z00.00 ENCOUNTER FOR PREVENTIVE HEALTH EXAMINATION: Status: ACTIVE | Noted: 2022-03-28

## 2022-03-29 ENCOUNTER — APPOINTMENT (OUTPATIENT)
Dept: HEART AND VASCULAR | Facility: CLINIC | Age: 82
End: 2022-03-29

## 2022-08-18 NOTE — DISCHARGE NOTE PROVIDER - PROVIDER TOKENS
Does not tolerate CPAP  Likely contributing to bradycardia     FREE:[LAST:[Loyd],FIRST:[Tracy],PHONE:[phone]],FAX:[(   )    -],ADDRESS:[[address] [place] [address].  [place] [address]  Primary Care Physician],FOLLOWUP:[Routine],ESTABLISHEDPATIENT:[T]] PROVIDER:[TOKEN:[[id number]],FOLLOWUP:[1 month]],FREE:[LAST:[Harp],FIRST:[Tracy],PHONE:[phone]],FAX:[(   )    -],ADDRESS:[[address] [place] [address].  [place] [address]  Primary Care Physician],FOLLOWUP:[Routine],ESTABLISHEDPATIENT:[T]],PROVIDER:[TOKEN:[[id number]],FOLLOWUP:[1 week]] PROVIDER:[TOKEN:[[id number]],FOLLOWUP:[1 month]],PROVIDER:[TOKEN:[[id number]],FOLLOWUP:[1 week]],FREE:[LAST:[Loyd],FIRST:[Tracy],PHONE:[phone]],FAX:[(   )    -],ADDRESS:[[address] [place] [address]  Primary Care Physician],SCHEDULEDAPPT:[02/15/2022],SCHEDULEDAPPTTIME:[11:00 AM],ESTABLISHEDPATIENT:[T]]

## 2025-04-24 NOTE — ED ADULT TRIAGE NOTE - NS ED NURSE DIRECT TO ROOM YN
Reached out to pt to get their percent relief from the injection that they had and to answer the following questions.      Lumbar MBB #1      1. What percentage of pain relief did you receive following the block, from 0-100%?     100%    2. What was pain score the day before your procedure on a scale from 0-10?  8/10    3. What was pain score immediately after your procedure (up to 6 hours)  on a scale from 0-10?    0/10    4. When your pain returned, what was your pain score on a scale from 0-10?     3/10    5. How many hours did pain relief last following the block?       Still in affect     6. During this time, please describe in detail the activities you were able to do?    Pt was able to sleep well       Pain Disability Index (PDI) Score Review:      5/23/2024     8:46 AM   Last 3 PDI Scores   Pain Disability Index (PDI) 64               Yes